# Patient Record
Sex: MALE | Race: BLACK OR AFRICAN AMERICAN | Employment: FULL TIME | URBAN - METROPOLITAN AREA
[De-identification: names, ages, dates, MRNs, and addresses within clinical notes are randomized per-mention and may not be internally consistent; named-entity substitution may affect disease eponyms.]

---

## 2020-10-29 ENCOUNTER — MED REC SCAN ONLY (OUTPATIENT)
Dept: INTERNAL MEDICINE CLINIC | Facility: CLINIC | Age: 60
End: 2020-10-29

## 2020-10-29 ENCOUNTER — LAB ENCOUNTER (OUTPATIENT)
Dept: LAB | Facility: HOSPITAL | Age: 60
End: 2020-10-29
Attending: INTERNAL MEDICINE
Payer: MEDICAID

## 2020-10-29 ENCOUNTER — OFFICE VISIT (OUTPATIENT)
Dept: INTERNAL MEDICINE CLINIC | Facility: CLINIC | Age: 60
End: 2020-10-29
Payer: MEDICAID

## 2020-10-29 VITALS
HEART RATE: 90 BPM | SYSTOLIC BLOOD PRESSURE: 158 MMHG | BODY MASS INDEX: 32.64 KG/M2 | WEIGHT: 241 LBS | HEIGHT: 72 IN | DIASTOLIC BLOOD PRESSURE: 78 MMHG

## 2020-10-29 DIAGNOSIS — Z00.00 ADULT GENERAL MEDICAL EXAM: Primary | ICD-10-CM

## 2020-10-29 DIAGNOSIS — Z12.11 SCREEN FOR COLON CANCER: ICD-10-CM

## 2020-10-29 DIAGNOSIS — Z00.00 ADULT GENERAL MEDICAL EXAM: ICD-10-CM

## 2020-10-29 DIAGNOSIS — Z23 NEED FOR INFLUENZA VACCINATION: ICD-10-CM

## 2020-10-29 PROCEDURE — 93005 ELECTROCARDIOGRAM TRACING: CPT

## 2020-10-29 PROCEDURE — 85027 COMPLETE CBC AUTOMATED: CPT

## 2020-10-29 PROCEDURE — 81001 URINALYSIS AUTO W/SCOPE: CPT

## 2020-10-29 PROCEDURE — 82570 ASSAY OF URINE CREATININE: CPT

## 2020-10-29 PROCEDURE — 36415 COLL VENOUS BLD VENIPUNCTURE: CPT

## 2020-10-29 PROCEDURE — 90686 IIV4 VACC NO PRSV 0.5 ML IM: CPT | Performed by: INTERNAL MEDICINE

## 2020-10-29 PROCEDURE — 80053 COMPREHEN METABOLIC PANEL: CPT

## 2020-10-29 PROCEDURE — 80061 LIPID PANEL: CPT

## 2020-10-29 PROCEDURE — 93010 ELECTROCARDIOGRAM REPORT: CPT | Performed by: INTERNAL MEDICINE

## 2020-10-29 PROCEDURE — 84443 ASSAY THYROID STIM HORMONE: CPT

## 2020-10-29 PROCEDURE — 3078F DIAST BP <80 MM HG: CPT | Performed by: INTERNAL MEDICINE

## 2020-10-29 PROCEDURE — 83036 HEMOGLOBIN GLYCOSYLATED A1C: CPT

## 2020-10-29 PROCEDURE — 3077F SYST BP >= 140 MM HG: CPT | Performed by: INTERNAL MEDICINE

## 2020-10-29 PROCEDURE — 99386 PREV VISIT NEW AGE 40-64: CPT | Performed by: INTERNAL MEDICINE

## 2020-10-29 PROCEDURE — 90471 IMMUNIZATION ADMIN: CPT | Performed by: INTERNAL MEDICINE

## 2020-10-29 PROCEDURE — 82043 UR ALBUMIN QUANTITATIVE: CPT

## 2020-10-29 PROCEDURE — 3008F BODY MASS INDEX DOCD: CPT | Performed by: INTERNAL MEDICINE

## 2020-10-29 NOTE — PROGRESS NOTES
Genevieve Linn is a 64year old male. Patient presents with:  Physical: annual--flu vaccine-est care    HPI:   46-year-old gentleman with past medical history of prediabetes however he is taking Metformin from his previous MD from [place] here to e tightness and wheezing. Cardiovascular: Negative for chest pain, palpitations and leg swelling. Gastrointestinal: Negative for vomiting, abdominal pain, diarrhea, constipation, blood in stool and abdominal distention.    Endocrine: Negative for cold in Referral for screening colonoscopy given. PSA has been ordered. Vaccines: Flu vaccine given  Labs: Ordered  Discussed about seatbelt use, fall prevention, alcohol and driving. Patient denies any abuse or depression.   Patient reports adherence with all t

## 2020-10-30 ENCOUNTER — APPOINTMENT (OUTPATIENT)
Dept: LAB | Facility: HOSPITAL | Age: 60
End: 2020-10-30
Attending: INTERNAL MEDICINE
Payer: MEDICAID

## 2020-10-30 PROCEDURE — 82274 ASSAY TEST FOR BLOOD FECAL: CPT | Performed by: INTERNAL MEDICINE

## 2020-11-05 ENCOUNTER — OFFICE VISIT (OUTPATIENT)
Dept: INTERNAL MEDICINE CLINIC | Facility: CLINIC | Age: 60
End: 2020-11-05
Payer: MEDICAID

## 2020-11-05 VITALS
BODY MASS INDEX: 33 KG/M2 | HEART RATE: 81 BPM | SYSTOLIC BLOOD PRESSURE: 153 MMHG | OXYGEN SATURATION: 97 % | WEIGHT: 241 LBS | DIASTOLIC BLOOD PRESSURE: 74 MMHG

## 2020-11-05 DIAGNOSIS — E78.5 DYSLIPIDEMIA: ICD-10-CM

## 2020-11-05 DIAGNOSIS — N52.9 ERECTILE DYSFUNCTION, UNSPECIFIED ERECTILE DYSFUNCTION TYPE: ICD-10-CM

## 2020-11-05 DIAGNOSIS — E11.9 TYPE 2 DIABETES MELLITUS WITHOUT COMPLICATION, WITHOUT LONG-TERM CURRENT USE OF INSULIN (HCC): Primary | ICD-10-CM

## 2020-11-05 DIAGNOSIS — I10 ESSENTIAL HYPERTENSION: ICD-10-CM

## 2020-11-05 PROCEDURE — 3077F SYST BP >= 140 MM HG: CPT | Performed by: INTERNAL MEDICINE

## 2020-11-05 PROCEDURE — 3078F DIAST BP <80 MM HG: CPT | Performed by: INTERNAL MEDICINE

## 2020-11-05 PROCEDURE — 99214 OFFICE O/P EST MOD 30 MIN: CPT | Performed by: INTERNAL MEDICINE

## 2020-11-05 RX ORDER — LISINOPRIL 20 MG/1
20 TABLET ORAL DAILY
Qty: 90 TABLET | Refills: 1 | Status: SHIPPED | OUTPATIENT
Start: 2020-11-05 | End: 2020-12-22

## 2020-11-05 RX ORDER — ATORVASTATIN CALCIUM 10 MG/1
10 TABLET, FILM COATED ORAL DAILY
Qty: 90 TABLET | Refills: 1 | Status: SHIPPED | OUTPATIENT
Start: 2020-11-05 | End: 2021-03-25

## 2020-11-05 RX ORDER — SILDENAFIL 100 MG/1
100 TABLET, FILM COATED ORAL AS NEEDED
Qty: 24 TABLET | Refills: 1 | Status: SHIPPED | OUTPATIENT
Start: 2020-11-05 | End: 2021-02-03

## 2020-11-05 NOTE — PROGRESS NOTES
Caro Lacy is a 64year old male. Patient presents with: Follow - Up: 1 week    HPI:   53-year-old gentleman here for follow-up. I saw him last week and at that time his blood pressure was high.   He informed me that he was given Metformin, ACE inhibitors dysfunction. Negative for hematuria. Skin: Negative for wound. Psychiatric/Behavioral: Negative for behavioral problems.      Wt Readings from Last 5 Encounters:  11/05/20 : 241 lb (109.3 kg)  10/29/20 : 241 lb (109.3 kg)    Body mass index is 32.69 kg/ 12/17/2020).

## 2020-11-14 ENCOUNTER — TELEPHONE (OUTPATIENT)
Dept: INTERNAL MEDICINE CLINIC | Facility: CLINIC | Age: 60
End: 2020-11-14

## 2020-11-16 ENCOUNTER — TELEPHONE (OUTPATIENT)
Dept: INTERNAL MEDICINE CLINIC | Facility: CLINIC | Age: 60
End: 2020-11-16

## 2020-11-16 RX ORDER — SILDENAFIL 100 MG/1
100 TABLET, FILM COATED ORAL AS NEEDED
Qty: 24 TABLET | Refills: 1 | Status: CANCELLED | OUTPATIENT
Start: 2020-11-16 | End: 2021-02-14

## 2020-12-19 ENCOUNTER — LAB ENCOUNTER (OUTPATIENT)
Dept: LAB | Facility: HOSPITAL | Age: 60
End: 2020-12-19
Attending: INTERNAL MEDICINE
Payer: MEDICAID

## 2020-12-19 DIAGNOSIS — E78.5 DYSLIPIDEMIA: ICD-10-CM

## 2020-12-19 DIAGNOSIS — I10 ESSENTIAL HYPERTENSION: ICD-10-CM

## 2020-12-19 DIAGNOSIS — N52.9 ERECTILE DYSFUNCTION, UNSPECIFIED ERECTILE DYSFUNCTION TYPE: ICD-10-CM

## 2020-12-19 PROCEDURE — 36415 COLL VENOUS BLD VENIPUNCTURE: CPT

## 2020-12-19 PROCEDURE — 83036 HEMOGLOBIN GLYCOSYLATED A1C: CPT

## 2020-12-22 ENCOUNTER — OFFICE VISIT (OUTPATIENT)
Dept: INTERNAL MEDICINE CLINIC | Facility: CLINIC | Age: 60
End: 2020-12-22
Payer: MEDICAID

## 2020-12-22 VITALS
RESPIRATION RATE: 14 BRPM | SYSTOLIC BLOOD PRESSURE: 156 MMHG | WEIGHT: 241 LBS | OXYGEN SATURATION: 98 % | BODY MASS INDEX: 32.64 KG/M2 | HEART RATE: 76 BPM | HEIGHT: 72 IN | DIASTOLIC BLOOD PRESSURE: 82 MMHG

## 2020-12-22 DIAGNOSIS — I10 ESSENTIAL HYPERTENSION: ICD-10-CM

## 2020-12-22 DIAGNOSIS — E11.9 TYPE 2 DIABETES MELLITUS WITHOUT COMPLICATION, WITHOUT LONG-TERM CURRENT USE OF INSULIN (HCC): Primary | ICD-10-CM

## 2020-12-22 DIAGNOSIS — E78.5 DYSLIPIDEMIA: ICD-10-CM

## 2020-12-22 PROCEDURE — 3008F BODY MASS INDEX DOCD: CPT | Performed by: INTERNAL MEDICINE

## 2020-12-22 PROCEDURE — 3079F DIAST BP 80-89 MM HG: CPT | Performed by: INTERNAL MEDICINE

## 2020-12-22 PROCEDURE — 3077F SYST BP >= 140 MM HG: CPT | Performed by: INTERNAL MEDICINE

## 2020-12-22 PROCEDURE — 99214 OFFICE O/P EST MOD 30 MIN: CPT | Performed by: INTERNAL MEDICINE

## 2020-12-22 RX ORDER — OMEGA-3-ACID ETHYL ESTERS 1 G/1
1 CAPSULE, LIQUID FILLED ORAL DAILY
COMMUNITY
End: 2021-03-25

## 2020-12-22 RX ORDER — LANCETS 30 GAUGE
1 EACH MISCELLANEOUS DAILY
COMMUNITY
Start: 2020-11-07

## 2020-12-22 RX ORDER — LISINOPRIL 40 MG/1
40 TABLET ORAL DAILY
Qty: 90 TABLET | Refills: 0 | Status: SHIPPED | OUTPATIENT
Start: 2020-12-22 | End: 2021-03-22

## 2020-12-22 RX ORDER — BLOOD SUGAR DIAGNOSTIC
1 STRIP MISCELLANEOUS DAILY
COMMUNITY
Start: 2020-11-07

## 2020-12-22 RX ORDER — BUPRENORPHINE HCL 8 MG/1
1 TABLET SUBLINGUAL DAILY
COMMUNITY

## 2020-12-22 RX ORDER — BLOOD-GLUCOSE METER
1 EACH MISCELLANEOUS DAILY
COMMUNITY
Start: 2020-11-07 | End: 2021-01-03

## 2020-12-22 NOTE — PROGRESS NOTES
Christina Servin is a 64year old male. Patient presents with: Follow - Up: BP F/u     HPI:   45-year-old gentleman with a past medical history of rectal dysfunction, diabetes, dyslipidemia, hypertension here for follow-up.   He reports that he has been taking hi appetite change and fever. HENT: Negative for congestion, facial swelling and voice change. Eyes: Negative for visual disturbance. Respiratory: Negative for cough, shortness of breath and stridor. Cardiovascular: Negative for chest pain.    Radha Richards Encourage patient to avoid salt. 3. Dyslipidemia  Continue with the statins and omega-3 fatty acid. Encourage patient to avoid fat fried foods and exercise as tolerated.   We will repeat lipid profile in 6 months    Plan: As above hemoglobin A1c ordered

## 2021-01-03 RX ORDER — BLOOD-GLUCOSE METER
EACH MISCELLANEOUS
Qty: 1 KIT | Refills: 0 | Status: SHIPPED | OUTPATIENT
Start: 2021-01-03

## 2021-01-07 ENCOUNTER — OFFICE VISIT (OUTPATIENT)
Dept: OPHTHALMOLOGY | Facility: CLINIC | Age: 61
End: 2021-01-07
Payer: MEDICAID

## 2021-01-07 DIAGNOSIS — H25.13 AGE-RELATED NUCLEAR CATARACT OF BOTH EYES: ICD-10-CM

## 2021-01-07 DIAGNOSIS — H52.203 ASTIGMATISM OF BOTH EYES WITH PRESBYOPIA: ICD-10-CM

## 2021-01-07 DIAGNOSIS — E11.9 TYPE 2 DIABETES MELLITUS WITHOUT RETINOPATHY (HCC): Primary | ICD-10-CM

## 2021-01-07 DIAGNOSIS — H52.4 ASTIGMATISM OF BOTH EYES WITH PRESBYOPIA: ICD-10-CM

## 2021-01-07 PROCEDURE — 99243 OFF/OP CNSLTJ NEW/EST LOW 30: CPT | Performed by: OPHTHALMOLOGY

## 2021-01-07 PROCEDURE — 92015 DETERMINE REFRACTIVE STATE: CPT | Performed by: OPHTHALMOLOGY

## 2021-01-07 NOTE — ASSESSMENT & PLAN NOTE
New glasses for bifocals for best corrected vision at distance and near. Suggest +2.50  over the counter glasses for reading.

## 2021-01-07 NOTE — PATIENT INSTRUCTIONS
Astigmatism of both eyes with presbyopia  New glasses for bifocals for best corrected vision at distance and near. Suggest +2.50  over the counter glasses for reading.     Type 2 diabetes mellitus without retinopathy ([address]  Diabetes type II: no background

## 2021-01-07 NOTE — PROGRESS NOTES
Angela Fabian is a 64year old male.     HPI:     HPI     Consult      Additional comments: Per Dr. Vaibhav Lemon               Diabetic Eye Exam      Additional comments: Pt has been a diabetic for 10 years       Pt's diabetes is currently controlled by pills  Pt MG Oral Tab Take 1 tablet (100 mg total) by mouth as needed for Erectile Dysfunction. 24 tablet 1   • metFORMIN HCl 1000 MG Oral Tab Take 1 tablet (1,000 mg total) by mouth 2 (two) times daily with meals.  180 tablet 1       Allergies:  No Known Allergies [facility] Add Near [address] [facility]    Right -0.25 +0.50 180 20/20 +2.50 20/20    Left [place] +0.50 180 20/20 +2.50 20/20          Final Rx       Sphere Cylinder [place] Dist [facility] Add Near [address] [facility]    Right -0.25 +0.50 180 20/20 +2.50 20/20    Left [place] +0.50 180 20/20 +2.50 20/20    Typ

## 2021-02-01 DIAGNOSIS — Z23 NEED FOR VACCINATION: ICD-10-CM

## 2021-02-03 ENCOUNTER — IMMUNIZATION (OUTPATIENT)
Dept: LAB | Age: 61
End: 2021-02-03
Attending: HOSPITALIST
Payer: MEDICAID

## 2021-02-03 DIAGNOSIS — Z23 NEED FOR VACCINATION: Primary | ICD-10-CM

## 2021-02-03 PROCEDURE — 0001A SARSCOV2 VAC 30MCG/0.3ML IM: CPT

## 2021-02-24 ENCOUNTER — IMMUNIZATION (OUTPATIENT)
Dept: LAB | Age: 61
End: 2021-02-24
Attending: HOSPITALIST
Payer: MEDICAID

## 2021-02-24 DIAGNOSIS — Z23 NEED FOR VACCINATION: Primary | ICD-10-CM

## 2021-02-24 PROCEDURE — 0002A SARSCOV2 VAC 30MCG/0.3ML IM: CPT

## 2021-03-25 ENCOUNTER — OFFICE VISIT (OUTPATIENT)
Dept: INTERNAL MEDICINE CLINIC | Facility: CLINIC | Age: 61
End: 2021-03-25
Payer: MEDICAID

## 2021-03-25 VITALS
HEIGHT: 72 IN | WEIGHT: 236 LBS | BODY MASS INDEX: 31.97 KG/M2 | RESPIRATION RATE: 14 BRPM | HEART RATE: 90 BPM | SYSTOLIC BLOOD PRESSURE: 156 MMHG | DIASTOLIC BLOOD PRESSURE: 86 MMHG | OXYGEN SATURATION: 97 %

## 2021-03-25 DIAGNOSIS — E11.9 TYPE 2 DIABETES MELLITUS WITHOUT RETINOPATHY (HCC): ICD-10-CM

## 2021-03-25 DIAGNOSIS — I10 ESSENTIAL HYPERTENSION: Primary | ICD-10-CM

## 2021-03-25 DIAGNOSIS — N52.9 ERECTILE DYSFUNCTION, UNSPECIFIED ERECTILE DYSFUNCTION TYPE: ICD-10-CM

## 2021-03-25 DIAGNOSIS — E78.5 DYSLIPIDEMIA: ICD-10-CM

## 2021-03-25 LAB
CARTRIDGE LOT#: 775 NUMERIC
HEMOGLOBIN A1C: 7.2 % (ref 4.3–5.6)

## 2021-03-25 PROCEDURE — 3077F SYST BP >= 140 MM HG: CPT | Performed by: INTERNAL MEDICINE

## 2021-03-25 PROCEDURE — 83036 HEMOGLOBIN GLYCOSYLATED A1C: CPT | Performed by: INTERNAL MEDICINE

## 2021-03-25 PROCEDURE — 3079F DIAST BP 80-89 MM HG: CPT | Performed by: INTERNAL MEDICINE

## 2021-03-25 PROCEDURE — 36416 COLLJ CAPILLARY BLOOD SPEC: CPT | Performed by: INTERNAL MEDICINE

## 2021-03-25 PROCEDURE — 99214 OFFICE O/P EST MOD 30 MIN: CPT | Performed by: INTERNAL MEDICINE

## 2021-03-25 PROCEDURE — 3008F BODY MASS INDEX DOCD: CPT | Performed by: INTERNAL MEDICINE

## 2021-03-25 RX ORDER — AMLODIPINE BESYLATE 10 MG/1
10 TABLET ORAL DAILY
Qty: 90 TABLET | Refills: 3 | Status: SHIPPED | OUTPATIENT
Start: 2021-03-25 | End: 2021-05-06

## 2021-03-25 RX ORDER — LISINOPRIL 40 MG/1
40 TABLET ORAL DAILY
Qty: 90 TABLET | Refills: 3 | Status: SHIPPED | OUTPATIENT
Start: 2021-03-25 | End: 2021-05-06

## 2021-03-25 RX ORDER — OMEGA-3-ACID ETHYL ESTERS 1 G/1
1 CAPSULE, LIQUID FILLED ORAL DAILY
Qty: 90 CAPSULE | Refills: 3 | Status: SHIPPED | OUTPATIENT
Start: 2021-03-25

## 2021-03-25 RX ORDER — SILDENAFIL 100 MG/1
100 TABLET, FILM COATED ORAL
Qty: 6 TABLET | Refills: 5 | Status: SHIPPED | OUTPATIENT
Start: 2021-03-25 | End: 2021-03-31

## 2021-03-25 RX ORDER — LISINOPRIL 40 MG/1
40 TABLET ORAL DAILY
COMMUNITY
End: 2021-03-25

## 2021-03-25 RX ORDER — SILDENAFIL 100 MG/1
100 TABLET, FILM COATED ORAL
COMMUNITY
End: 2021-03-25

## 2021-03-25 RX ORDER — ATORVASTATIN CALCIUM 10 MG/1
10 TABLET, FILM COATED ORAL DAILY
Qty: 90 TABLET | Refills: 1 | Status: SHIPPED | OUTPATIENT
Start: 2021-03-25 | End: 2021-05-06

## 2021-03-25 NOTE — PROGRESS NOTES
Benedicto Hampton is a 63year old male. Patient presents with: Follow - Up: 3 mos f/u     HPI:   56-year-old gentleman with a past medical history of hypertension, diabetes, dyslipidemia, erectile dysfunction here for follow-up. Reports that he is doing well. Macular degeneration Neg       No Known Allergies     REVIEW OF SYSTEMS:     Review of Systems   Constitutional: Negative for activity change, appetite change and fever. HENT: Negative for congestion and voice change.     Respiratory: Negative for cough a blood pressure it is running high. I have added amlodipine. Continue the lisinopril as well. Discussed low-salt diet. 2. Type 2 diabetes mellitus without retinopathy ([facility] [address]  She is repeat hemoglobin A1c in the office is 7.2.   We will continue the curr

## 2021-03-26 ENCOUNTER — TELEPHONE (OUTPATIENT)
Dept: INTERNAL MEDICINE CLINIC | Facility: CLINIC | Age: 61
End: 2021-03-26

## 2021-03-26 NOTE — TELEPHONE ENCOUNTER
Prior authorization for Omega-3-acid Ethyl Esters 1GM capsules completed w/ [employer] on cover my meds Key: [id number], turn around time 1-5 days.

## 2021-03-29 ENCOUNTER — TELEPHONE (OUTPATIENT)
Dept: INTERNAL MEDICINE CLINIC | Facility: CLINIC | Age: 61
End: 2021-03-29

## 2021-03-29 RX ORDER — SILDENAFIL 100 MG/1
100 TABLET, FILM COATED ORAL
Qty: 6 TABLET | Refills: 5 | Status: CANCELLED | OUTPATIENT
Start: 2021-03-29 | End: 2021-04-28

## 2021-03-30 NOTE — TELEPHONE ENCOUNTER
Prior authorization for sildenafil has been initiated through [employer] using keycode: [id number] It takes about 1-5 business days for a decision to come back.

## 2021-03-30 NOTE — TELEPHONE ENCOUNTER
He can get omega-3 fatty acid 1 g 2 times a day over-the-counter. Please inform patient.   Thank you

## 2021-03-31 RX ORDER — SILDENAFIL 100 MG/1
TABLET, FILM COATED ORAL
Qty: 24 TABLET | Refills: 0 | Status: SHIPPED | OUTPATIENT
Start: 2021-03-31 | End: 2021-08-26

## 2021-05-06 ENCOUNTER — OFFICE VISIT (OUTPATIENT)
Dept: INTERNAL MEDICINE CLINIC | Facility: CLINIC | Age: 61
End: 2021-05-06
Payer: MEDICAID

## 2021-05-06 VITALS
OXYGEN SATURATION: 98 % | DIASTOLIC BLOOD PRESSURE: 74 MMHG | WEIGHT: 236 LBS | HEIGHT: 72 IN | SYSTOLIC BLOOD PRESSURE: 110 MMHG | BODY MASS INDEX: 31.97 KG/M2 | RESPIRATION RATE: 14 BRPM | HEART RATE: 82 BPM

## 2021-05-06 DIAGNOSIS — Z12.11 SCREEN FOR COLON CANCER: ICD-10-CM

## 2021-05-06 DIAGNOSIS — E66.9 CLASS 1 OBESITY WITHOUT SERIOUS COMORBIDITY WITH BODY MASS INDEX (BMI) OF 30.0 TO 30.9 IN ADULT, UNSPECIFIED OBESITY TYPE: ICD-10-CM

## 2021-05-06 DIAGNOSIS — E11.9 TYPE 2 DIABETES MELLITUS WITHOUT RETINOPATHY (HCC): ICD-10-CM

## 2021-05-06 DIAGNOSIS — I10 ESSENTIAL HYPERTENSION: Primary | ICD-10-CM

## 2021-05-06 DIAGNOSIS — E78.5 DYSLIPIDEMIA: ICD-10-CM

## 2021-05-06 PROCEDURE — 99214 OFFICE O/P EST MOD 30 MIN: CPT | Performed by: INTERNAL MEDICINE

## 2021-05-06 PROCEDURE — 3074F SYST BP LT 130 MM HG: CPT | Performed by: INTERNAL MEDICINE

## 2021-05-06 PROCEDURE — 3008F BODY MASS INDEX DOCD: CPT | Performed by: INTERNAL MEDICINE

## 2021-05-06 PROCEDURE — 3078F DIAST BP <80 MM HG: CPT | Performed by: INTERNAL MEDICINE

## 2021-05-06 RX ORDER — ATORVASTATIN CALCIUM 10 MG/1
10 TABLET, FILM COATED ORAL DAILY
Qty: 90 TABLET | Refills: 1 | Status: SHIPPED | OUTPATIENT
Start: 2021-05-06 | End: 2022-05-01

## 2021-05-06 RX ORDER — AMLODIPINE BESYLATE 10 MG/1
10 TABLET ORAL DAILY
Qty: 90 TABLET | Refills: 1 | Status: SHIPPED | OUTPATIENT
Start: 2021-05-06

## 2021-05-06 RX ORDER — LISINOPRIL 40 MG/1
40 TABLET ORAL DAILY
Qty: 90 TABLET | Refills: 1 | Status: SHIPPED | OUTPATIENT
Start: 2021-05-06 | End: 2021-08-04

## 2021-05-06 NOTE — PROGRESS NOTES
Andrew Atlamirano is a 63year old male. Patient presents with: Follow - Up: BP F/u   Hypertension  Follow-up of diabetes, hypertension and dyslipidemia.   HPI:   65-year-old gentleman with a past medical history of diabetes, hypertension, dyslipidemia, erectile Problem Relation Age of Onset   • Diabetes Neg    • Glaucoma Neg    • Macular degeneration Neg       No Known Allergies     REVIEW OF SYSTEMS:     Review of Systems   Constitutional: Negative for activity change, appetite change and fever.    HENT: Gabriele Lynn Behavior: Behavior normal.            ASSESSMENT AND PLAN:   1. Essential hypertension  Well-controlled. Medication refilled. Labs reviewed. Discussed low-salt diet. He will increase activity during summertime.     2. Type 2 diabetes mellitus without re

## 2021-06-23 ENCOUNTER — OFFICE VISIT (OUTPATIENT)
Dept: INTERNAL MEDICINE CLINIC | Facility: CLINIC | Age: 61
End: 2021-06-23
Payer: MEDICAID

## 2021-06-23 ENCOUNTER — LAB ENCOUNTER (OUTPATIENT)
Dept: LAB | Facility: HOSPITAL | Age: 61
End: 2021-06-23
Attending: INTERNAL MEDICINE
Payer: MEDICAID

## 2021-06-23 VITALS
HEART RATE: 86 BPM | HEIGHT: 72 IN | BODY MASS INDEX: 31.02 KG/M2 | WEIGHT: 229 LBS | SYSTOLIC BLOOD PRESSURE: 120 MMHG | OXYGEN SATURATION: 98 % | DIASTOLIC BLOOD PRESSURE: 74 MMHG | RESPIRATION RATE: 14 BRPM

## 2021-06-23 DIAGNOSIS — Z00.00 ADULT GENERAL MEDICAL EXAM: ICD-10-CM

## 2021-06-23 DIAGNOSIS — Z00.00 ADULT GENERAL MEDICAL EXAM: Primary | ICD-10-CM

## 2021-06-23 PROCEDURE — 81003 URINALYSIS AUTO W/O SCOPE: CPT

## 2021-06-23 PROCEDURE — 86580 TB INTRADERMAL TEST: CPT | Performed by: INTERNAL MEDICINE

## 2021-06-23 PROCEDURE — 99396 PREV VISIT EST AGE 40-64: CPT | Performed by: INTERNAL MEDICINE

## 2021-06-23 PROCEDURE — 3074F SYST BP LT 130 MM HG: CPT | Performed by: INTERNAL MEDICINE

## 2021-06-23 PROCEDURE — 80307 DRUG TEST PRSMV CHEM ANLYZR: CPT

## 2021-06-23 PROCEDURE — 3078F DIAST BP <80 MM HG: CPT | Performed by: INTERNAL MEDICINE

## 2021-06-23 PROCEDURE — 3008F BODY MASS INDEX DOCD: CPT | Performed by: INTERNAL MEDICINE

## 2021-06-23 NOTE — PROGRESS NOTES
Danilo Stout is a 63year old male. Patient presents with:  Employment Physical: Form fill out     HPI:   53-year-old gentleman with a past medical history of hypertension, diabetes, dyslipidemia here for preemployment physical.  He has no complaints.   He h Past Medical History:   Diagnosis Date   • Pre-diabetes       Past Surgical History:   Procedure Laterality Date   • APPENDECTOMY     • US BIOPSY RENAL RIGHT (CPT=76942/45589)        Social History:  Social History    Tobacco Use      Smoking status: Nev Normocephalic. Right Ear: Tympanic membrane normal.      Left Ear: Tympanic membrane normal.      Nose: Nose normal.      Mouth/Throat:      Mouth: Mucous membranes are moist.      Pharynx: Oropharynx is clear.    Eyes:      General: No scleral icterus have reviewed his preemployment requirement. he needs tuberculin skin test.  He needs urine pH and drug screen. I have ordered for that. - DRUG SCREEN 7 W/CONFIRMATION, URINE; Future  - PH, URINE;  Future    Plan: If his drug screen is okay and tubercu

## 2021-06-25 ENCOUNTER — NURSE ONLY (OUTPATIENT)
Dept: INTERNAL MEDICINE CLINIC | Facility: CLINIC | Age: 61
End: 2021-06-25
Payer: MEDICAID

## 2021-06-25 DIAGNOSIS — Z71.1 PERSON WITH FEARED COMPLAINT IN WHOM NO DIAGNOSIS WAS MADE: Primary | ICD-10-CM

## 2021-08-26 RX ORDER — SILDENAFIL 100 MG/1
TABLET, FILM COATED ORAL
Qty: 24 TABLET | Refills: 0 | Status: SHIPPED | OUTPATIENT
Start: 2021-08-26

## 2021-11-30 ENCOUNTER — IMMUNIZATION (OUTPATIENT)
Dept: LAB | Facility: HOSPITAL | Age: 61
End: 2021-11-30
Attending: EMERGENCY MEDICINE
Payer: COMMERCIAL

## 2021-11-30 DIAGNOSIS — Z23 NEED FOR VACCINATION: Primary | ICD-10-CM

## 2021-11-30 PROCEDURE — 0004A SARSCOV2 VAC 30MCG/0.3ML IM: CPT

## 2022-02-10 RX ORDER — SILDENAFIL 100 MG/1
100 TABLET, FILM COATED ORAL AS NEEDED
Qty: 24 TABLET | Refills: 0 | Status: SHIPPED | OUTPATIENT
Start: 2022-02-10

## 2022-02-10 NOTE — TELEPHONE ENCOUNTER
Please review; protocol failed. Or has no protocol    Requested Prescriptions   Pending Prescriptions Disp Refills    Sildenafil Citrate 100 MG Oral Tab 24 tablet 0     Sig: Take 1 tablet (100 mg total) by mouth as needed for Erectile Dysfunction.         Genitourinary Medications Passed - 2/9/2022  8:48 PM        Passed - Patient does not have pulmonary hypertension on problem list        Passed - Appointment in the past 12 or next 3 months               Recent Outpatient Visits              7 months ago Person with feared complaint in whom no diagnosis was made    [facility], [address], [facility]    Nurse Only    7 months ago Adult general medical exam    [facility], [address], Elida Mckay MD    Office Visit    9 months ago Essential hypertension    [facility], [address], Elida Mckay MD    Office Visit    10 months ago Essential hypertension    [facility], [address], Elida Mckay MD    Office Visit    1 year ago Type 2 diabetes mellitus without retinopathy [facility] Shyann Kirby MD    Office Visit

## 2022-03-16 ENCOUNTER — TELEPHONE (OUTPATIENT)
Dept: INTERNAL MEDICINE CLINIC | Facility: CLINIC | Age: 62
End: 2022-03-16

## 2022-03-16 DIAGNOSIS — Z12.11 COLON CANCER SCREENING: Primary | ICD-10-CM

## 2022-04-21 ENCOUNTER — LAB ENCOUNTER (OUTPATIENT)
Dept: LAB | Facility: HOSPITAL | Age: 62
End: 2022-04-21
Attending: INTERNAL MEDICINE
Payer: COMMERCIAL

## 2022-04-21 ENCOUNTER — HOSPITAL ENCOUNTER (OUTPATIENT)
Dept: GENERAL RADIOLOGY | Facility: HOSPITAL | Age: 62
Discharge: HOME | End: 2022-04-21
Attending: INTERNAL MEDICINE
Payer: COMMERCIAL

## 2022-04-21 ENCOUNTER — OFFICE VISIT (OUTPATIENT)
Dept: INTERNAL MEDICINE CLINIC | Facility: CLINIC | Age: 62
End: 2022-04-21
Payer: COMMERCIAL

## 2022-04-21 VITALS
WEIGHT: 233 LBS | HEART RATE: 78 BPM | HEIGHT: 72 IN | SYSTOLIC BLOOD PRESSURE: 130 MMHG | RESPIRATION RATE: 14 BRPM | OXYGEN SATURATION: 98 % | BODY MASS INDEX: 31.56 KG/M2 | DIASTOLIC BLOOD PRESSURE: 84 MMHG

## 2022-04-21 DIAGNOSIS — Z00.00 ADULT GENERAL MEDICAL EXAM: ICD-10-CM

## 2022-04-21 DIAGNOSIS — E11.9 TYPE 2 DIABETES MELLITUS WITHOUT RETINOPATHY (HCC): Primary | ICD-10-CM

## 2022-04-21 DIAGNOSIS — Z12.11 SCREEN FOR COLON CANCER: ICD-10-CM

## 2022-04-21 DIAGNOSIS — R07.89 ATYPICAL CHEST PAIN: ICD-10-CM

## 2022-04-21 DIAGNOSIS — Z00.00 ROUTINE GENERAL MEDICAL EXAMINATION AT A HEALTH CARE FACILITY: Primary | ICD-10-CM

## 2022-04-21 LAB
ALBUMIN SERPL-MCNC: 4.4 G/DL (ref 3.4–5)
ALBUMIN/GLOB SERPL: 1.3 {RATIO} (ref 1–2)
ALP LIVER SERPL-CCNC: 81 U/L
ALT SERPL-CCNC: 27 U/L
ANION GAP SERPL CALC-SCNC: 8 MMOL/L (ref 0–18)
AST SERPL-CCNC: 18 U/L (ref 15–37)
BILIRUB SERPL-MCNC: 0.3 MG/DL (ref 0.1–2)
BUN BLD-MCNC: 15 MG/DL (ref 7–18)
BUN/CREAT SERPL: 16 (ref 10–20)
CALCIUM BLD-MCNC: 9.4 MG/DL (ref 8.5–10.1)
CHLORIDE SERPL-SCNC: 103 MMOL/L (ref 98–112)
CHOLEST SERPL-MCNC: 180 MG/DL (ref ?–200)
CO2 SERPL-SCNC: 26 MMOL/L (ref 21–32)
COMPLEXED PSA SERPL-MCNC: 3.76 NG/ML (ref ?–4)
CREAT BLD-MCNC: 0.94 MG/DL
CREAT UR-SCNC: 131 MG/DL
DEPRECATED RDW RBC AUTO: 40.4 FL (ref 35.1–46.3)
ERYTHROCYTE [DISTWIDTH] IN BLOOD BY AUTOMATED COUNT: 12.1 % (ref 11–15)
EST. AVERAGE GLUCOSE BLD GHB EST-MCNC: 235 MG/DL (ref 68–126)
FASTING PATIENT LIPID ANSWER: YES
FASTING STATUS PATIENT QL REPORTED: YES
GLOBULIN PLAS-MCNC: 3.4 G/DL (ref 2.8–4.4)
GLUCOSE BLD-MCNC: 162 MG/DL (ref 70–99)
HBA1C MFR BLD: 9.8 % (ref ?–5.7)
HCT VFR BLD AUTO: 42.9 %
HDLC SERPL-MCNC: 44 MG/DL (ref 40–59)
HGB BLD-MCNC: 14 G/DL
LDLC SERPL CALC-MCNC: 110 MG/DL (ref ?–100)
MCH RBC QN AUTO: 29.7 PG (ref 26–34)
MCHC RBC AUTO-ENTMCNC: 32.6 G/DL (ref 31–37)
MCV RBC AUTO: 90.9 FL
MICROALBUMIN UR-MCNC: 64.8 MG/DL
MICROALBUMIN/CREAT 24H UR-RTO: 494.7 UG/MG (ref ?–30)
NONHDLC SERPL-MCNC: 136 MG/DL (ref ?–130)
OSMOLALITY SERPL CALC.SUM OF ELEC: 288 MOSM/KG (ref 275–295)
PLATELET # BLD AUTO: 255 10(3)UL (ref 150–450)
POTASSIUM SERPL-SCNC: 4 MMOL/L (ref 3.5–5.1)
PROT SERPL-MCNC: 7.8 G/DL (ref 6.4–8.2)
RBC # BLD AUTO: 4.72 X10(6)UL
SODIUM SERPL-SCNC: 137 MMOL/L (ref 136–145)
TRIGL SERPL-MCNC: 149 MG/DL (ref 30–149)
TSI SER-ACNC: 1.31 MIU/ML (ref 0.36–3.74)
VLDLC SERPL CALC-MCNC: 25 MG/DL (ref 0–30)
WBC # BLD AUTO: 7.5 X10(3) UL (ref 4–11)

## 2022-04-21 PROCEDURE — 71046 X-RAY EXAM CHEST 2 VIEWS: CPT | Performed by: INTERNAL MEDICINE

## 2022-04-21 PROCEDURE — 80053 COMPREHEN METABOLIC PANEL: CPT | Performed by: INTERNAL MEDICINE

## 2022-04-21 PROCEDURE — 84443 ASSAY THYROID STIM HORMONE: CPT | Performed by: INTERNAL MEDICINE

## 2022-04-21 PROCEDURE — 99214 OFFICE O/P EST MOD 30 MIN: CPT | Performed by: INTERNAL MEDICINE

## 2022-04-21 PROCEDURE — 85027 COMPLETE CBC AUTOMATED: CPT | Performed by: INTERNAL MEDICINE

## 2022-04-21 PROCEDURE — 3008F BODY MASS INDEX DOCD: CPT | Performed by: INTERNAL MEDICINE

## 2022-04-21 PROCEDURE — 36415 COLL VENOUS BLD VENIPUNCTURE: CPT | Performed by: INTERNAL MEDICINE

## 2022-04-21 PROCEDURE — 82570 ASSAY OF URINE CREATININE: CPT | Performed by: INTERNAL MEDICINE

## 2022-04-21 PROCEDURE — 3079F DIAST BP 80-89 MM HG: CPT | Performed by: INTERNAL MEDICINE

## 2022-04-21 PROCEDURE — 83036 HEMOGLOBIN GLYCOSYLATED A1C: CPT | Performed by: INTERNAL MEDICINE

## 2022-04-21 PROCEDURE — 82043 UR ALBUMIN QUANTITATIVE: CPT | Performed by: INTERNAL MEDICINE

## 2022-04-21 PROCEDURE — 80061 LIPID PANEL: CPT

## 2022-04-21 PROCEDURE — 3075F SYST BP GE 130 - 139MM HG: CPT | Performed by: INTERNAL MEDICINE

## 2022-04-21 RX ORDER — ATORVASTATIN CALCIUM 10 MG/1
10 TABLET, FILM COATED ORAL DAILY
Qty: 90 TABLET | Refills: 1 | Status: SHIPPED | OUTPATIENT
Start: 2022-04-21 | End: 2023-04-16

## 2022-04-21 RX ORDER — AMLODIPINE BESYLATE 10 MG/1
10 TABLET ORAL DAILY
Qty: 90 TABLET | Refills: 1 | Status: SHIPPED | OUTPATIENT
Start: 2022-04-21

## 2022-04-22 RX ORDER — LISINOPRIL 5 MG/1
5 TABLET ORAL DAILY
Qty: 90 TABLET | Refills: 1 | Status: SHIPPED | OUTPATIENT
Start: 2022-04-22 | End: 2022-07-21

## 2022-04-27 ENCOUNTER — LAB ENCOUNTER (OUTPATIENT)
Dept: LAB | Facility: HOSPITAL | Age: 62
End: 2022-04-27
Attending: INTERNAL MEDICINE
Payer: COMMERCIAL

## 2022-04-27 DIAGNOSIS — R07.89 ATYPICAL CHEST PAIN: ICD-10-CM

## 2022-04-28 LAB — SARS-COV-2 RNA RESP QL NAA+PROBE: NOT DETECTED

## 2022-06-15 RX ORDER — SILDENAFIL 100 MG/1
100 TABLET, FILM COATED ORAL AS NEEDED
Qty: 24 TABLET | Refills: 0 | Status: SHIPPED | OUTPATIENT
Start: 2022-06-15

## 2022-10-05 DIAGNOSIS — Z00.00 ADULT GENERAL MEDICAL EXAM: ICD-10-CM

## 2022-10-06 ENCOUNTER — TELEPHONE (OUTPATIENT)
Dept: INTERNAL MEDICINE CLINIC | Facility: CLINIC | Age: 62
End: 2022-10-06

## 2022-10-06 RX ORDER — SILDENAFIL 100 MG/1
100 TABLET, FILM COATED ORAL AS NEEDED
Qty: 24 TABLET | Refills: 0 | Status: SHIPPED | OUTPATIENT
Start: 2022-10-06

## 2022-10-06 RX ORDER — AMLODIPINE BESYLATE 10 MG/1
10 TABLET ORAL DAILY
Qty: 90 TABLET | Refills: 1 | Status: SHIPPED | OUTPATIENT
Start: 2022-10-06

## 2022-10-06 RX ORDER — ATORVASTATIN CALCIUM 10 MG/1
10 TABLET, FILM COATED ORAL DAILY
Qty: 90 TABLET | Refills: 1 | Status: SHIPPED | OUTPATIENT
Start: 2022-10-06 | End: 2023-10-01

## 2022-10-06 RX ORDER — SILDENAFIL 100 MG/1
100 TABLET, FILM COATED ORAL AS NEEDED
Qty: 24 TABLET | Refills: 0 | OUTPATIENT
Start: 2022-10-06

## 2022-10-06 NOTE — TELEPHONE ENCOUNTER
Protocol failed or has No Protocol, please review  Requested Prescriptions   Pending Prescriptions Disp Refills    metFORMIN HCl 1000 MG Oral Tab 180 tablet 1     Sig: Take 1 tablet (1,000 mg total) by mouth 2 (two) times daily with meals. Diabetes Medication Protocol Failed - 10/5/2022  4:57 PM        Failed - Last A1C < 7.5 and within past 6 months     Lab Results   Component Value Date    A1C 9.8 (H) 04/21/2022               Passed - In person appointment or virtual visit in the past 6 mos or appointment in next 3 mos       Recent Outpatient Visits              5 months ago Type 2 diabetes mellitus without retinopathy [facility])    [facility], [address], [place]    Office Visit    1 year ago Person with feared complaint in whom no diagnosis was made    [facility], [address], [place]    Nurse Only    1 year ago Adult general medical exam    [facility], [address], [place]    Office Visit    1 year ago Essential hypertension    [facility], [address], [place]    Office Visit    1 year ago Essential hypertension    [facility], [address], [place]    Office Visit     Future Appointments         Provider Department Appt Notes    In 1 week [facility] or GFRAA > 50     GFR Evaluation  GFRAA: 101 , resulted on 4/21/2022            Passed - GFR in the past 12 months          Signed Prescriptions Disp Refills    atorvastatin (LIPITOR) 10 MG Oral Tab 90 tablet 1     Sig: Take 1 tablet (10 mg total) by mouth daily.         Cholesterol Medication Protocol Passed - 10/5/2022  4:57 PM        Passed - ALT in past 12 months        Passed - LDL in past 12 months        Passed - Last ALT < 80       Lab Results   Component Value Date    ALT 27 04/21/2022             Passed - Last LDL < 130     Lab Results   Component Value Date     (H) 04/21/2022               Passed - In person appointment or virtual visit in the past 12 mos or appointment in next 3 mos       Recent Outpatient Visits              5 months ago Type 2 diabetes mellitus without retinopathy [facility])    [address], [address], [place]    Office Visit    1 year ago Person with feared complaint in whom no diagnosis was made    [address] [place], [address], [address]    Nurse Only    1 year ago Adult general medical exam    [address] [place], [address], [place]    Office Visit    1 year ago Essential hypertension    [address] [place], [address], [place]    Office Visit    1 year ago Essential hypertension    [address] [place], [address], [place]    Office Visit     Future Appointments         Provider Department Appt Notes    In 1 week [address] [place] [address]                   amLODIPine 10 MG Oral Tab 90 tablet 1     Sig: Take 1 tablet (10 mg total) by mouth daily.         Hypertensive Medications Protocol Passed - 10/5/2022  4:57 PM        Passed - In person appointment in the past 12 or next 3 months       Recent Outpatient Visits              5 months ago Type 2 diabetes mellitus without retinopathy [facility])    [address], [address], [place]    Office Visit    1 year ago Person with feared complaint in whom no diagnosis was made    [address] [place], [address], [address]    Nurse Only    1 year ago Adult general medical exam    [address] [place], [address], [place]    Office Visit    1 year ago Essential hypertension    [address] [place], [address], [place]    Office Visit    1 year ago Essential hypertension    [facility], [place]    Office Visit     Future Appointments Provider Department Appt Notes    In 1 week [facility]                Passed - Last BP reading less than 140/90     BP Readings from Last 1 Encounters:  04/21/22 : 130/84                Passed - CMP or BMP in past 6 months     Recent Results (from the past 4392 hour(s))   COMP METABOLIC PANEL (14)    Collection Time: 04/21/22  3:25 PM   Result Value Ref Range    Glucose 162 (H) 70 - 99 mg/dL    Sodium 137 136 - 145 mmol/L    Potassium 4.0 3.5 - 5.1 mmol/L    Chloride 103 98 - 112 mmol/L    CO2 26.0 21.0 - 32.0 mmol/L    Anion Gap 8 0 - 18 mmol/L    BUN 15 7 - 18 mg/dL    Creatinine 0.94 0.70 - 1.30 mg/dL    BUN/CREA Ratio 16.0 10.0 - 20.0    Calcium, Total 9.4 8.5 - 10.1 mg/dL    Calculated Osmolality 288 275 - 295 mOsm/kg    GFR, Non- 88 >=60    GFR, -American 101 >=60    ALT 27 16 - 61 U/L    AST 18 15 - 37 U/L    Alkaline Phosphatase 81 45 - 117 U/L    Bilirubin, Total 0.3 0.1 - 2.0 mg/dL    Total Protein 7.8 6.4 - 8.2 g/dL    Albumin 4.4 3.4 - 5.0 g/dL    Globulin  3.4 2.8 - 4.4 g/dL    A/G Ratio 1.3 1.0 - 2.0    Patient Fasting for CMP? Yes      *Note: Due to a large number of results and/or encounters for the requested time period, some results have not been displayed. A complete set of results can be found in Results Review.                  Passed - In person appointment or virtual visit in the past 6 months       Recent Outpatient Visits              5 months ago Type 2 diabetes mellitus without retinopathy [facility])    [place] [facility], [address], [place]    Office Visit    1 year ago Person with feared complaint in whom no diagnosis was made    [place] [facility], [facility], [address], [place]    Nurse Only    1 year ago Adult general medical exam    Marta Valdes MD    Office Visit    1 year ago Essential hypertension    [address] [facility] Doretha Rodriguez MD    Office Visit    1 year ago Essential hypertension    Danae Urrutia MD    Office Visit     Future Appointments         Provider Department Appt Notes    In 1 week Artem Pitts                Passed - [facility] or GFRAA > 50     GFR Evaluation  GFRAA: 101 , resulted on 4/21/2022               Sildenafil Citrate 100 MG Oral Tab 24 tablet 0     Sig: Take 1 tablet (100 mg total) by mouth as needed for Erectile Dysfunction.         Genitourinary Medications Passed - 10/5/2022  4:57 PM        Passed - Patient does not have pulmonary hypertension on problem list        Passed - In person appointment or virtual visit in the past 12 mos or appointment in next 3 mos       Recent Outpatient Visits              5 months ago Type 2 diabetes mellitus without retinopathy [facility])    [address], Danae Garza MD    Office Visit    1 year ago Person with feared complaint in whom no diagnosis was made    [address], [place]    Nurse Only    1 year ago Adult general medical exam    [address] Mildred Champion, MD    Office Visit    1 year ago Essential hypertension    [address] Mildred Champion, MD    Office Visit    1 year ago Essential hypertension    [address] Mildred Champion, MD    Office Visit     Future Appointments         Provider Department Appt Notes    In 1 week [address] [facility] [address]                    Future Appointments         Provider Department Appt Notes    In 1 week Artem Pitts           Recent Outpatient Visits              5 months ago Type 2 diabetes mellitus without retinopathy [facility])    [address], [address], [place]    Office Visit    1 year ago Person with feared complaint in whom no diagnosis was made    [address], [address], [place]    Nurse Only    1 year ago Adult general medical exam    [address], [address], [place]    Office Visit    1 year ago Essential hypertension    [address], [address], [place]    Office Visit    1 year ago Essential hypertension    [place] Matt MD    Office Visit

## 2022-10-06 NOTE — TELEPHONE ENCOUNTER
Refill passed per [address] protocol. Requested Prescriptions   Pending Prescriptions Disp Refills    metFORMIN HCl 1000 MG Oral Tab 180 tablet 1     Sig: Take 1 tablet (1,000 mg total) by mouth 2 (two) times daily with meals. Diabetes Medication Protocol Failed - 10/5/2022  4:57 PM        Failed - Last A1C < 7.5 and within past 6 months     Lab Results   Component Value Date    A1C 9.8 (H) 04/21/2022               Passed - In person appointment or virtual visit in the past 6 mos or appointment in next 3 mos       Recent Outpatient Visits              5 months ago Type 2 diabetes mellitus without retinopathy [facility])    [address], [address], [place]    Office Visit    1 year ago Person with feared complaint in whom no diagnosis was made    [address], [address], [facility]    Nurse Only    1 year ago Adult general medical exam    [address], [address], [place]    Office Visit    1 year ago Essential hypertension    [address], [address], [place]    Office Visit    1 year ago Essential hypertension    [address], [address], [place]    Office Visit     Future Appointments         Provider Department Appt Notes    In 1 week 742 [facility] or GFRAA > 50     GFR Evaluation  GFRAA: 101 , resulted on 4/21/2022            Passed - GFR in the past 12 months           atorvastatin (LIPITOR) 10 MG Oral Tab 90 tablet 1     Sig: Take 1 tablet (10 mg total) by mouth daily.         Cholesterol Medication Protocol Passed - 10/5/2022  4:57 PM        Passed - ALT in past 12 months        Passed - LDL in past 12 months        Passed - Last ALT < 80       Lab Results   Component Value Date    ALT 27 04/21/2022             Passed - Last LDL < 130     Lab Results   Component Value Date     (H) 04/21/2022               Passed - In person appointment or virtual visit in the past 12 mos or appointment in next 3 mos       Recent Outpatient Visits              5 months ago Type 2 diabetes mellitus without retinopathy [facility])    Swathi Esteves, [address], [place]    Office Visit    1 year ago Person with feared complaint in whom no diagnosis was made    Swathi Esteves, [address], [place]    Nurse Only    1 year ago Adult general medical exam    Swathi Esteves, [address], [place]    Office Visit    1 year ago Essential hypertension    Swathi Esteves, [address], [place]    Office Visit    1 year ago Essential hypertension    Swathi Esteves, [address], [place]    Office Visit     Future Appointments         Provider Department Appt Notes    In 1 week [address] [place] [address]                   amLODIPine 10 MG Oral Tab 90 tablet 1     Sig: Take 1 tablet (10 mg total) by mouth daily.         Hypertensive Medications Protocol Passed - 10/5/2022  4:57 PM        Passed - In person appointment in the past 12 or next 3 months       Recent Outpatient Visits              5 months ago Type 2 diabetes mellitus without retinopathy [facility])    Swathi Esteves, [address], [place]    Office Visit    1 year ago Person with feared complaint in whom no diagnosis was made    Swathi Estevse, [address], [place]    Nurse Only    1 year ago Adult general medical exam    Swathi Esteves, [address], [place]    Office Visit    1 year ago Essential hypertension    Swathi Esteves, [address]y MD    Office Visit    1 year ago Essential hypertension    Swathi Esteves, [address]y MD    Office Visit     Future Appointments         Provider Department Appt Notes    In 1 week 137 [facility]                Passed - Last BP reading less than 140/90     BP Readings from Last 1 Encounters:  04/21/22 : 130/84                Passed - CMP or BMP in past 6 months     Recent Results (from the past 4392 hour(s))   COMP METABOLIC PANEL (14)    Collection Time: 04/21/22  3:25 PM   Result Value Ref Range    Glucose 162 (H) 70 - 99 mg/dL    Sodium 137 136 - 145 mmol/L    Potassium 4.0 3.5 - 5.1 mmol/L    Chloride 103 98 - 112 mmol/L    CO2 26.0 21.0 - 32.0 mmol/L    Anion Gap 8 0 - 18 mmol/L    BUN 15 7 - 18 mg/dL    Creatinine 0.94 0.70 - 1.30 mg/dL    BUN/CREA Ratio 16.0 10.0 - 20.0    Calcium, Total 9.4 8.5 - 10.1 mg/dL    Calculated Osmolality 288 275 - 295 mOsm/kg    GFR, Non- 88 >=60    GFR, -American 101 >=60    ALT 27 16 - 61 U/L    AST 18 15 - 37 U/L    Alkaline Phosphatase 81 45 - 117 U/L    Bilirubin, Total 0.3 0.1 - 2.0 mg/dL    Total Protein 7.8 6.4 - 8.2 g/dL    Albumin 4.4 3.4 - 5.0 g/dL    Globulin  3.4 2.8 - 4.4 g/dL    A/G Ratio 1.3 1.0 - 2.0    Patient Fasting for CMP? Yes      *Note: Due to a large number of results and/or encounters for the requested time period, some results have not been displayed. A complete set of results can be found in Results Review.                  Passed - In person appointment or virtual visit in the past 6 months       Recent Outpatient Visits              5 months ago Type 2 diabetes mellitus without retinopathy [facility])    [place] [facility], [address], [place]    Office Visit    1 year ago Person with feared complaint in whom no diagnosis was made    [facility], [address], [employer]    Nurse Only    1 year ago Adult general medical exam    [facility], [address], [place] Sus[place]    Office Visit    1 year ago Essential hypertension    Susy Farooq MD Office Visit    1 year ago Essential hypertension    [facility], [address], Fadia Gonzalez MD    Office Visit     Future Appointments         Provider Department Appt Notes    In 1 week [address] [place] [address]                Passed - EGFRCR or GFRAA > 50     GFR Evaluation  GFRAA: 101 , resulted on 4/21/2022               Sildenafil Citrate 100 MG Oral Tab 24 tablet 0     Sig: Take 1 tablet (100 mg total) by mouth as needed for Erectile Dysfunction.         Genitourinary Medications Passed - 10/5/2022  4:57 PM        Passed - Patient does not have pulmonary hypertension on problem list        Passed - In person appointment or virtual visit in the past 12 mos or appointment in next 3 mos       Recent Outpatient Visits              5 months ago Type 2 diabetes mellitus without retinopathy [facility])    [facility], [address], Fadia Gonzalez MD    Office Visit    1 year ago Person with feared complaint in whom no diagnosis was made    [facility], [address], [employer]    Nurse Only    1 year ago Adult general medical exam    [facility], [address], Fadia Gonzalez MD    Office Visit    1 year ago Essential hypertension    [facility], [address], Fadia Gonzalez MD    Office Visit    1 year ago Essential hypertension    [facility], [address]Fadia MD    Office Visit     Future Appointments         Provider Department Appt Notes    In 1 week Artem Pitts                    Recent Outpatient Visits              5 months ago Type 2 diabetes mellitus without retinopathy [facility])    Rico Calixto, Fadia Gonzalez MD    Office Visit    1 year ago Person with feared complaint in whom no diagnosis was made    [employer], [address], [employer]    Nurse Only    1 year ago Adult general medical exam    Jonathan Cruz MD    Office Visit    1 year ago Essential hypertension    Susy Sandoval MD    Office Visit    1 year ago Essential hypertension    [address], [place], MD Susy    Office Visit          Future Appointments         Provider Department Appt Notes    In 1 week [address] [facility] [address] [facility] [address]

## 2022-10-10 NOTE — TELEPHONE ENCOUNTER
PA -  [address] [facility] [id number]  [place]  ID [id number]  [facility] [id number]    #[phone]        Medical records [phone] OV   Sent records from 2021, last OV with dx code of ED. Await form via fax.

## 2022-10-11 ENCOUNTER — MED REC SCAN ONLY (OUTPATIENT)
Dept: INTERNAL MEDICINE CLINIC | Facility: CLINIC | Age: 62
End: 2022-10-11

## 2022-10-15 ENCOUNTER — IMMUNIZATION (OUTPATIENT)
Dept: LAB | Age: 62
End: 2022-10-15
Attending: EMERGENCY MEDICINE
Payer: COMMERCIAL

## 2022-10-15 DIAGNOSIS — Z23 NEED FOR VACCINATION: Primary | ICD-10-CM

## 2022-10-15 PROCEDURE — 90471 IMMUNIZATION ADMIN: CPT

## 2022-10-15 PROCEDURE — 0124A SARSCOV2 VAC BVL 30MCG/0.3ML: CPT

## 2022-10-15 PROCEDURE — 90686 IIV4 VACC NO PRSV 0.5 ML IM: CPT

## 2023-01-12 ENCOUNTER — TELEPHONE (OUTPATIENT)
Dept: INTERNAL MEDICINE CLINIC | Facility: CLINIC | Age: 63
End: 2023-01-12

## 2023-01-12 DIAGNOSIS — E11.9 TYPE 2 DIABETES MELLITUS WITHOUT RETINOPATHY (HCC): Primary | ICD-10-CM

## 2023-01-12 NOTE — TELEPHONE ENCOUNTER
Refill passed per [facility] protocol. Tadalafil 2.5 mg or 5mg preferred - please review. Thank you. [employer] Requested Prescriptions   Pending Prescriptions Disp Refills    Sildenafil Citrate 100 MG Oral Tab 24 tablet 0     Sig: Take 1 tablet (100 mg total) by mouth as needed for Erectile Dysfunction.        Genitourinary Medications Passed - 1/10/2023  2:14 PM        Passed - Patient does not have pulmonary hypertension on problem list        Passed - In person appointment or virtual visit in the past 12 mos or appointment in next 3 mos     Recent Outpatient Visits              8 months ago Type 2 diabetes mellitus without retinopathy [facility])    [facility], [address]y MD    Office Visit    1 year ago Person with feared complaint in whom no diagnosis was made    [facility], [address], [place]    Nurse Only    1 year ago Adult general medical exam    [facility], [address]y MD    Office Visit    1 year ago Essential hypertension    [facility], [address]Susy MD    Office Visit    1 year ago Essential hypertension    [facility], [address]Susy MD    Office Visit                           Recent Outpatient Visits              8 months ago Type 2 diabetes mellitus without retinopathy [facility])    [facility], [address]Susy MD    Office Visit    1 year ago Person with feared complaint in whom no diagnosis was made    [facility], [address], [place]    Nurse Only    1 year ago Adult general medical exam    [facility], [address]Susy MD    Office Visit    1 year ago Essential hypertension    [facility], [address]Susy MD    Office Visit    1 year ago Essential hypertension    Susy Newberry MD    Office Visit

## 2023-01-12 NOTE — TELEPHONE ENCOUNTER
Please instruct patient to go to [employer] and complete hemoglobin A1c (diabetes test). His diabetes was uncontrolled during the last blood test.  It is extremely important to control diabetes. Please inform him.

## 2023-01-18 RX ORDER — SILDENAFIL 100 MG/1
100 TABLET, FILM COATED ORAL AS NEEDED
Qty: 24 TABLET | Refills: 1 | Status: SHIPPED | OUTPATIENT
Start: 2023-01-18

## 2023-01-18 NOTE — TELEPHONE ENCOUNTER
Refill passed per [place] [employer] protocol. Requested Prescriptions   Pending Prescriptions Disp Refills    Sildenafil Citrate 100 MG Oral Tab 24 tablet 0     Sig: Take 1 tablet (100 mg total) by mouth as needed for Erectile Dysfunction.        Genitourinary Medications Passed - 1/18/2023 10:37 AM        Passed - Patient does not have pulmonary hypertension on problem list        Passed - In person appointment or virtual visit in the past 12 mos or appointment in next 3 mos     Recent Outpatient Visits              9 months ago Type 2 diabetes mellitus without retinopathy ([facility] [address]    [address] MD    Office Visit    1 year ago Person with feared complaint in whom no diagnosis was made    [facility], [address], [place]    Nurse Only    1 year ago Adult general medical exam    [address] [place] [address] MD    Office Visit    1 year ago Essential hypertension    [address] MD    Office Visit    1 year ago Essential hypertension    [address] MD    Office Visit

## 2023-02-02 ENCOUNTER — TELEPHONE (OUTPATIENT)
Facility: CLINIC | Age: 63
End: 2023-02-02

## 2023-02-02 DIAGNOSIS — E11.9 TYPE 2 DIABETES MELLITUS WITHOUT RETINOPATHY (HCC): Primary | ICD-10-CM

## 2023-02-02 RX ORDER — SILDENAFIL 100 MG/1
100 TABLET, FILM COATED ORAL AS NEEDED
Qty: 24 TABLET | Refills: 0 | OUTPATIENT
Start: 2023-02-02

## 2023-02-02 NOTE — TELEPHONE ENCOUNTER
He is due for blood testing. He has uncontrolled diabetes. Please inform patient that uncontrolled diabetes can lead to endorgan damage such as retinopathy, kidney damage etc.  It is extremely important that we control the diabetes. I have ordered the blood testing for him. Please let him complete it asap. Also, encourage patient to make an appointment to see me in the office in next couple of months.   Thank you

## 2023-02-19 LAB — HEMOGLOBIN A1C: 11.5 % OF TOTAL HGB

## 2023-04-18 ENCOUNTER — TELEPHONE (OUTPATIENT)
Dept: ENDOCRINOLOGY CLINIC | Facility: CLINIC | Age: 63
End: 2023-04-18

## 2023-04-18 ENCOUNTER — OFFICE VISIT (OUTPATIENT)
Dept: ENDOCRINOLOGY CLINIC | Facility: CLINIC | Age: 63
End: 2023-04-18

## 2023-04-18 VITALS
HEIGHT: 72 IN | BODY MASS INDEX: 30.75 KG/M2 | WEIGHT: 227 LBS | DIASTOLIC BLOOD PRESSURE: 73 MMHG | SYSTOLIC BLOOD PRESSURE: 123 MMHG | HEART RATE: 72 BPM

## 2023-04-18 DIAGNOSIS — E11.65 UNCONTROLLED TYPE 2 DIABETES MELLITUS WITH HYPERGLYCEMIA (HCC): Primary | ICD-10-CM

## 2023-04-18 LAB
CARTRIDGE LOT#: ABNORMAL NUMERIC
GLUCOSE BLOOD: 188
HEMOGLOBIN A1C: 10.9 % (ref 4.3–5.6)
TEST STRIP LOT #: NORMAL NUMERIC

## 2023-04-18 PROCEDURE — 3008F BODY MASS INDEX DOCD: CPT | Performed by: NURSE PRACTITIONER

## 2023-04-18 PROCEDURE — 3074F SYST BP LT 130 MM HG: CPT | Performed by: NURSE PRACTITIONER

## 2023-04-18 PROCEDURE — 82947 ASSAY GLUCOSE BLOOD QUANT: CPT | Performed by: NURSE PRACTITIONER

## 2023-04-18 PROCEDURE — 3046F HEMOGLOBIN A1C LEVEL >9.0%: CPT | Performed by: NURSE PRACTITIONER

## 2023-04-18 PROCEDURE — 3078F DIAST BP <80 MM HG: CPT | Performed by: NURSE PRACTITIONER

## 2023-04-18 PROCEDURE — 83036 HEMOGLOBIN GLYCOSYLATED A1C: CPT | Performed by: NURSE PRACTITIONER

## 2023-04-18 PROCEDURE — 99204 OFFICE O/P NEW MOD 45 MIN: CPT | Performed by: NURSE PRACTITIONER

## 2023-04-18 RX ORDER — BLOOD SUGAR DIAGNOSTIC
1 STRIP MISCELLANEOUS DAILY
Qty: 100 EACH | Refills: 1 | Status: SHIPPED | OUTPATIENT
Start: 2023-04-18

## 2023-04-18 RX ORDER — SEMAGLUTIDE 0.68 MG/ML
0.25 INJECTION, SOLUTION SUBCUTANEOUS WEEKLY
Qty: 9 ML | Refills: 0 | Status: SHIPPED | OUTPATIENT
Start: 2023-04-18

## 2023-04-18 RX ORDER — GLIPIZIDE 5 MG/1
5 TABLET ORAL
Qty: 90 TABLET | Refills: 0 | Status: SHIPPED | OUTPATIENT
Start: 2023-04-18 | End: 2023-07-17

## 2023-04-18 NOTE — PATIENT INSTRUCTIONS
A1C: 10.9% today -->decreased from 11.5% on 2/18/2023  Blood glucose: 188 in clinic today    Medications:   -continue with Metformin 1,000mg with breakfast            1,000mg with dinner   - start Glipizide 5mg with dinner   - start ozempic 0.25mg once weekly   Common side effects:    Nausea or diarrhea    These effects usually go away over time as your body gets used to the medicine      Here are some things that might help your nausea go away:    Eat small amounts of food instrad of few large meals   Eat plain, bland non greasy foods    Drink plenty of fluids (sugar free)    Avoid foods and smells that might make you sick    Eat slowly and listen to your hunger    .-lets work on limiting carbohydrates to 45-60gm per meal/ max 180gm per day  -avoid eating snacks between meals  -avoid eating sweets or soda/fruit juices or Gatorade or energy drinks   -eat dinner at least 2 hours prior to going to bed at night  - increase (aerobic) exercise to at least 4-5 days per week for at least 30 mins each session    -avoid going more than 2 consecutive days of no exercise      - please give me an update on your blood glucose readings in 3 weeks   - schedule an apt with Melva (diabetes educator) as soon as able to review low carb diet    Weight:  Wt Readings from Last 6 Encounters:  04/18/23 : 227 lb (103 kg)  04/21/22 : 233 lb (105.7 kg)  06/23/21 : 229 lb (103.9 kg)  05/06/21 : 236 lb (107 kg)  03/25/21 : 236 lb (107 kg)  12/22/20 : 241 lb (109.3 kg)    A1C goal:  <7.0%    Blood sugar testing:  Test your blood sugar 1 time daily   Recommended times to test: alternate with before breakfast (fasting) or 2hrs after meals     Blood sugar targets:  Before breakfast:  (preferably < 110)  2 hours after meals: <180 (preferably <150)     Call for persistent blood sugars < 75 or > 200

## 2023-04-24 ENCOUNTER — TELEPHONE (OUTPATIENT)
Dept: ENDOCRINOLOGY CLINIC | Facility: CLINIC | Age: 63
End: 2023-04-24

## 2023-04-24 NOTE — TELEPHONE ENCOUNTER
PRIOR AUTHORIZATION:  Semaglutide,0.25 or 0.5MG/DOS, (OZEMPIC, 0.25 OR 0.5 MG/DOSE,) 2 MG/3ML Subcutaneous Solution Pen-injector, Inject 0.25 mg into the skin once a week., Disp: 9 mL, Rfl: 0    KEY: [id number]

## 2023-04-25 NOTE — TELEPHONE ENCOUNTER
Medication PA Requested: Semaglutide,0.25 or 0.5MG/DOS, (OZEMPIC, 0.25 OR 0.5 MG/DOSE,) 2 MG/3ML Subcutaneous Solution Pen-injector                                                         [employer]eds Used:  Key:  Quantity: 9 ml  Day Supply:  Sig: Inject 0.25 mg into the skin once a week.   DX Code: E11.65                                     CPT code (if applicable):   Case Number/Pending Ref#:

## 2023-04-27 NOTE — TELEPHONE ENCOUNTER
Medication PA Requested: Semaglutide,0.25 or 0.5MG/DOS, (OZEMPIC, 0.25 OR 0.5 MG/DOSE,) 2 MG/3ML Subcutaneous Solution Pen-injector                                                         [employer]eds Used:  Key:[id number]  Quantity: 9 ml  Day Supply:  Sig: Inject 0.25 mg into the skin once a week.   DX Code: E11.65    epa submitted with LOV, A1c 4/18/2023  Awaiting determination from [employer]

## 2023-05-02 NOTE — TELEPHONE ENCOUNTER
Received fax from [employer] regarding ozempic. The request for ozempic has been granted 4/29/23 and ends 4/29/24. Case # [id number]    Sent to scanning.

## 2023-05-03 NOTE — TELEPHONE ENCOUNTER
Mc, [phone], spoke with Dimas Pool, states ozempic went through.     My chart message sent to patient of approval.

## 2023-06-22 ENCOUNTER — OFFICE VISIT (OUTPATIENT)
Facility: CLINIC | Age: 63
End: 2023-06-22

## 2023-06-22 VITALS
RESPIRATION RATE: 14 BRPM | DIASTOLIC BLOOD PRESSURE: 70 MMHG | HEART RATE: 76 BPM | SYSTOLIC BLOOD PRESSURE: 130 MMHG | BODY MASS INDEX: 30.88 KG/M2 | HEIGHT: 72 IN | WEIGHT: 228 LBS | OXYGEN SATURATION: 98 %

## 2023-06-22 DIAGNOSIS — Z12.11 SCREEN FOR COLON CANCER: ICD-10-CM

## 2023-06-22 DIAGNOSIS — E11.9 TYPE 2 DIABETES MELLITUS WITHOUT RETINOPATHY (HCC): ICD-10-CM

## 2023-06-22 DIAGNOSIS — Z00.00 ADULT GENERAL MEDICAL EXAM: Primary | ICD-10-CM

## 2023-06-22 PROCEDURE — 3075F SYST BP GE 130 - 139MM HG: CPT | Performed by: INTERNAL MEDICINE

## 2023-06-22 PROCEDURE — 99396 PREV VISIT EST AGE 40-64: CPT | Performed by: INTERNAL MEDICINE

## 2023-06-22 PROCEDURE — 3008F BODY MASS INDEX DOCD: CPT | Performed by: INTERNAL MEDICINE

## 2023-06-22 PROCEDURE — 3078F DIAST BP <80 MM HG: CPT | Performed by: INTERNAL MEDICINE

## 2023-06-25 PROCEDURE — 82274 ASSAY TEST FOR BLOOD FECAL: CPT

## 2023-06-26 ENCOUNTER — LAB ENCOUNTER (OUTPATIENT)
Dept: LAB | Facility: HOSPITAL | Age: 63
End: 2023-06-26
Attending: INTERNAL MEDICINE
Payer: COMMERCIAL

## 2023-06-26 DIAGNOSIS — Z00.00 ADULT GENERAL MEDICAL EXAM: ICD-10-CM

## 2023-06-26 DIAGNOSIS — E11.9 TYPE 2 DIABETES MELLITUS WITHOUT RETINOPATHY (HCC): ICD-10-CM

## 2023-06-26 DIAGNOSIS — Z12.11 SCREEN FOR COLON CANCER: ICD-10-CM

## 2023-06-26 LAB
ALBUMIN SERPL-MCNC: 3.8 G/DL (ref 3.4–5)
ALBUMIN/GLOB SERPL: 1 {RATIO} (ref 1–2)
ALP LIVER SERPL-CCNC: 64 U/L
ALT SERPL-CCNC: 28 U/L
ANION GAP SERPL CALC-SCNC: 8 MMOL/L (ref 0–18)
AST SERPL-CCNC: 24 U/L (ref 15–37)
BILIRUB SERPL-MCNC: 0.4 MG/DL (ref 0.1–2)
BUN BLD-MCNC: 12 MG/DL (ref 7–18)
BUN/CREAT SERPL: 14.5 (ref 10–20)
CALCIUM BLD-MCNC: 8.9 MG/DL (ref 8.5–10.1)
CHLORIDE SERPL-SCNC: 106 MMOL/L (ref 98–112)
CHOLEST SERPL-MCNC: 153 MG/DL (ref ?–200)
CO2 SERPL-SCNC: 26 MMOL/L (ref 21–32)
COMPLEXED PSA SERPL-MCNC: 4.32 NG/ML (ref ?–4)
CREAT BLD-MCNC: 0.83 MG/DL
CREAT UR-SCNC: 219 MG/DL
DEPRECATED RDW RBC AUTO: 42.1 FL (ref 35.1–46.3)
ERYTHROCYTE [DISTWIDTH] IN BLOOD BY AUTOMATED COUNT: 12.8 % (ref 11–15)
EST. AVERAGE GLUCOSE BLD GHB EST-MCNC: 186 MG/DL (ref 68–126)
FASTING PATIENT LIPID ANSWER: YES
FASTING STATUS PATIENT QL REPORTED: YES
GFR SERPLBLD BASED ON 1.73 SQ M-ARVRAT: 100 ML/MIN/1.73M2 (ref 60–?)
GLOBULIN PLAS-MCNC: 3.8 G/DL (ref 2.8–4.4)
GLUCOSE BLD-MCNC: 98 MG/DL (ref 70–99)
HBA1C MFR BLD: 8.1 % (ref ?–5.7)
HCT VFR BLD AUTO: 38.8 %
HDLC SERPL-MCNC: 42 MG/DL (ref 40–59)
HEMOCCULT STL QL: NEGATIVE
HGB BLD-MCNC: 12.9 G/DL
LDLC SERPL CALC-MCNC: 90 MG/DL (ref ?–100)
MCH RBC QN AUTO: 29.9 PG (ref 26–34)
MCHC RBC AUTO-ENTMCNC: 33.2 G/DL (ref 31–37)
MCV RBC AUTO: 89.8 FL
MICROALBUMIN UR-MCNC: 53.7 MG/DL
MICROALBUMIN/CREAT 24H UR-RTO: 245.2 UG/MG (ref ?–30)
NONHDLC SERPL-MCNC: 111 MG/DL (ref ?–130)
OSMOLALITY SERPL CALC.SUM OF ELEC: 290 MOSM/KG (ref 275–295)
PLATELET # BLD AUTO: 238 10(3)UL (ref 150–450)
POTASSIUM SERPL-SCNC: 3.6 MMOL/L (ref 3.5–5.1)
PROT SERPL-MCNC: 7.6 G/DL (ref 6.4–8.2)
RBC # BLD AUTO: 4.32 X10(6)UL
SODIUM SERPL-SCNC: 140 MMOL/L (ref 136–145)
TRIGL SERPL-MCNC: 114 MG/DL (ref 30–149)
TSI SER-ACNC: 2.53 MIU/ML (ref 0.36–3.74)
VLDLC SERPL CALC-MCNC: 18 MG/DL (ref 0–30)
WBC # BLD AUTO: 7.2 X10(3) UL (ref 4–11)

## 2023-06-26 PROCEDURE — 82570 ASSAY OF URINE CREATININE: CPT | Performed by: INTERNAL MEDICINE

## 2023-06-26 PROCEDURE — 83036 HEMOGLOBIN GLYCOSYLATED A1C: CPT

## 2023-06-26 PROCEDURE — 36415 COLL VENOUS BLD VENIPUNCTURE: CPT

## 2023-06-26 PROCEDURE — 84443 ASSAY THYROID STIM HORMONE: CPT

## 2023-06-26 PROCEDURE — 80061 LIPID PANEL: CPT | Performed by: INTERNAL MEDICINE

## 2023-06-26 PROCEDURE — 80053 COMPREHEN METABOLIC PANEL: CPT

## 2023-06-26 PROCEDURE — 85027 COMPLETE CBC AUTOMATED: CPT

## 2023-06-26 PROCEDURE — 82043 UR ALBUMIN QUANTITATIVE: CPT | Performed by: INTERNAL MEDICINE

## 2023-06-27 ENCOUNTER — TELEPHONE (OUTPATIENT)
Dept: ENDOCRINOLOGY CLINIC | Facility: CLINIC | Age: 63
End: 2023-06-27

## 2023-06-27 RX ORDER — SEMAGLUTIDE 0.68 MG/ML
0.25 INJECTION, SOLUTION SUBCUTANEOUS WEEKLY
Qty: 9 ML | Refills: 0 | Status: SHIPPED | OUTPATIENT
Start: 2023-06-27

## 2023-06-28 DIAGNOSIS — R97.20 ELEVATED PSA: Primary | ICD-10-CM

## 2023-07-17 RX ORDER — GLIPIZIDE 5 MG/1
5 TABLET ORAL
Qty: 90 TABLET | Refills: 0 | Status: SHIPPED | OUTPATIENT
Start: 2023-07-17

## 2023-07-17 NOTE — TELEPHONE ENCOUNTER
LOV: 4/18/23    RTC: 6 weeks    FU: No FU Appt Scheduled    3 Month Supply Pending    [facility]

## 2023-08-03 DIAGNOSIS — Z00.00 ADULT GENERAL MEDICAL EXAM: ICD-10-CM

## 2023-08-04 RX ORDER — AMLODIPINE BESYLATE 10 MG/1
10 TABLET ORAL DAILY
Qty: 90 TABLET | Refills: 3 | Status: SHIPPED | OUTPATIENT
Start: 2023-08-04

## 2023-08-04 RX ORDER — ATORVASTATIN CALCIUM 10 MG/1
10 TABLET, FILM COATED ORAL DAILY
Qty: 90 TABLET | Refills: 3 | Status: SHIPPED | OUTPATIENT
Start: 2023-08-04

## 2023-08-04 NOTE — TELEPHONE ENCOUNTER
Please review. Protocol Failed or has No Protocol. Requested Prescriptions   Pending Prescriptions Disp Refills    metFORMIN HCl 1000 MG Oral Tab [Pharmacy Med Name: METFORMIN 1000MG TABLETS] 180 tablet 0     Sig: Take 1 tablet (1,000 mg total) by mouth 2 (two) times daily with meals. Diabetes Medication Protocol Failed - 8/3/2023  4:06 PM        Failed - Last A1C < 7.5 and within past 6 months     Lab Results   Component Value Date    A1C 8.1 (H) 06/26/2023             Passed - In person appointment or virtual visit in the past 6 mos or appointment in next 3 mos     Recent Outpatient Visits              1 month ago Adult general medical exam    Grace Adames MD    Office Visit    3 months ago Uncontrolled type 2 diabetes mellitus with hyperglycemia [facility])    [address], [address] [facility], [facility], [facility]    Office Visit    1 year ago Type 2 diabetes mellitus without retinopathy ([facility] [address]    [address], [facility]    Office Visit    2 years ago Person with feared complaint in whom no diagnosis was made    [address], [address], [place]    Nurse Only    2 years ago Adult general medical exam    Susy Rizvi MD    Office Visit          Future Appointments         Provider Department Appt Notes    In 4 months Moon Lai MD [address], [facility] Return in about 6 months (around 12/22/2023). Passed - EGFRCR or GFRAA > 50     GFR Evaluation  EGFRCR: 100 , resulted on 6/26/2023          Passed - GFR in the past 12 months         Signed Prescriptions Disp Refills    atorvastatin 10 MG Oral Tab 90 tablet 3     Sig: Take 1 tablet (10 mg total) by mouth daily.        Cholesterol Medication Protocol Passed - 8/3/2023  4:06 PM        Passed - ALT in past 12 months        Passed - LDL in past 12 months        Passed - Last ALT < 80     Lab Results   Component Value Date    ALT 28 06/26/2023             Passed - Last LDL < 130     Lab Results   Component Value Date    LDL 90 06/26/2023             Passed - In person appointment or virtual visit in the past 12 mos or appointment in next 3 mos     Recent Outpatient Visits              1 month ago Adult general medical exam    Susy Pierson MD    Office Visit    3 months ago Uncontrolled type 2 diabetes mellitus with hyperglycemia [facility])    Юлия Baron, [address] [place], [place] [address], [place]    Office Visit    1 year ago Type 2 diabetes mellitus without retinopathy ([facility] [address]    [facility]    Office Visit    2 years ago Person with feared complaint in whom no diagnosis was made    [address]лия Baron, [address] [facility],[address], [place]    Nurse Only    2 years ago Adult general medical exam    Susy Schroeder MD    Office Visit          Future Appointments         Provider Department Appt Notes    In 4 months MD [facility] Return in about 6 months (around 12/22/2023). amLODIPine 10 MG Oral Tab 90 tablet 3     Sig: Take 1 tablet (10 mg total) by mouth daily.        Hypertensive Medications Protocol Passed - 8/3/2023  4:06 PM        Passed - In person appointment in the past 12 or next 3 months     Recent Outpatient Visits              1 month ago Adult general medical exam    Susy Pierson MD    Office Visit    3 months ago Uncontrolled type 2 diabetes mellitus with hyperglycemia [facility])    [address] Baron, [address] [place] [address], [place], [place]    Office Visit    1 year ago Type 2 diabetes mellitus without retinopathy ([facility] [address]    Clifford Aguilar, [address], [place]    Office Visit    2 years ago Person with feared complaint in whom no diagnosis was made    Cl[address] Aguilar, [address], [place]    Nurse Only    2 years ago Adult general medical exam    Susy Amaya MD    Office Visit          Future Appointments         Provider Department Appt Notes    In 4 months MD Clifford Nielsen [address] 84 Return in about 6 months (around 12/22/2023). Passed - Last BP reading less than 140/90     BP Readings from Last 1 Encounters:  06/22/23 : 130/70              Passed - CMP or BMP in past 6 months     Recent Results (from the past 4392 hour(s))   COMP METABOLIC PANEL (14)    Collection Time: 06/26/23 10:50 AM   Result Value Ref Range    Glucose 98 70 - 99 mg/dL    Sodium 140 136 - 145 mmol/L    Potassium 3.6 3.5 - 5.1 mmol/L    Chloride 106 98 - 112 mmol/L    CO2 26.0 21.0 - 32.0 mmol/L    Anion Gap 8 0 - 18 mmol/L    BUN 12 7 - 18 mg/dL    Creatinine 0.83 0.70 - 1.30 mg/dL    BUN/CREA Ratio 14.5 10.0 - 20.0    Calcium, Total 8.9 8.5 - 10.1 mg/dL    Calculated Osmolality 290 275 - 295 mOsm/kg    eGFR-Cr 100 >=60 mL/min/1.73m2    ALT 28 16 - 61 U/L    AST 24 15 - 37 U/L    Alkaline Phosphatase 64 45 - 117 U/L    Bilirubin, Total 0.4 0.1 - 2.0 mg/dL    Total Protein 7.6 6.4 - 8.2 g/dL    Albumin 3.8 3.4 - 5.0 g/dL    Globulin  3.8 2.8 - 4.4 g/dL    A/G Ratio 1.0 1.0 - 2.0    Patient Fasting for CMP? Yes      *Note: Due to a large number of results and/or encounters for the requested time period, some results have not been displayed. A complete set of results can be found in Results Review.                Passed - In person appointment or virtual visit in the past 6 months     Recent Outpatient Visits              1 month ago Adult general medical exam    [employer], [place] [address] [facility], MD Susy    Office Visit    3 months ago Uncontrolled type 2 diabetes mellitus with hyperglycemia [facility])    Yasmin Persaud, [address] [facility], [facility], [facility]    Office Visit    1 year ago Type 2 diabetes mellitus without retinopathy ([facility] [address]    Yasmin Persaud, [address], [place]    Office Visit    2 years ago Person with feared complaint in whom no diagnosis was made    Yasmin Persaud, [address], [place]    Nurse Only    2 years ago Adult general medical exam    [address] [place] [address], MD Susy    Office Visit          Future Appointments         Provider Department Appt Notes    In 4 months MD Yasmin Medel [address] Return in about 6 months (around 12/22/2023).                Passed - EGFRCR or GFRAA > 50     GFR Evaluation  EGFRCR: 100 , resulted on 6/26/2023               Recent Outpatient Visits              1 month ago Adult general medical exam    [facility]min Persaud, [facility]    Office Visit    3 months ago Uncontrolled type 2 diabetes mellitus with hyperglycemia [facility])    Yasmin Persaud, [address] [facility], [facility]    Office Visit    1 year ago Type 2 diabetes mellitus without retinopathy ([facility] [address]    Yasmin Persaud, [address], [place]    Office Visit    2 years ago Person with feared complaint in whom no diagnosis was made    Yasmin Persaud, [address], [place]    Nurse Only    2 years ago Adult general medical exam    Yasmin Persaud, [address] MD    Office Visit            Future Appointments         Provider Department Appt Notes    In 4 months Carrillo Calix MD [facility], [facility] 84 Return in about 6 months (around 12/22/2023).

## 2023-08-04 NOTE — TELEPHONE ENCOUNTER
Refill passed per [employer], [facility] protocol. Requested Prescriptions   Pending Prescriptions Disp Refills    METFORMIN HCL 1000 MG Oral Tab [Pharmacy Med Name: METFORMIN 1000MG TABLETS] 180 tablet 0     Sig: TAKE 1 TABLET(1000 MG) BY MOUTH TWICE DAILY WITH MEALS       Diabetes Medication Protocol Failed - 8/3/2023  4:06 PM        Failed - Last A1C < 7.5 and within past 6 months     Lab Results   Component Value Date    A1C 8.1 (H) 06/26/2023             Passed - In person appointment or virtual visit in the past 6 mos or appointment in next 3 mos     Recent Outpatient Visits              1 month ago Adult general medical exam    [address], [address] [facility], [place] Susy    Office Visit    3 months ago Uncontrolled type 2 diabetes mellitus with hyperglycemia [facility])    [address], [address] [facility], [place]    Office Visit    1 year ago Type 2 diabetes mellitus without retinopathy ([facility] [address]    [address]    Office Visit    2 years ago Person with feared complaint in whom no diagnosis was made    [address], [address], [place]    Nurse Only    2 years ago Adult general medical exam    [place] Slaughter MD    Office Visit          Future Appointments         Provider Department Appt Notes    In 4 months Margie Cutler MD [address], [facility] 84 Return in about 6 months (around 12/22/2023).                Passed - EGFRCR or GFRAA > 50     GFR Evaluation  EGFRCR: 100 , resulted on 6/26/2023          Passed - GFR in the past 12 months          ATORVASTATIN 10 MG Oral Tab [Pharmacy Med Name: ATORVASTATIN 10MG TABLETS] 90 tablet 0     Sig: TAKE 1 TABLET(10 MG) BY MOUTH DAILY       Cholesterol Medication Protocol Passed - 8/3/2023  4:06 PM        Passed - ALT in past 12 months Passed - LDL in past 12 months        Passed - Last ALT < 80     Lab Results   Component Value Date    ALT 28 06/26/2023             Passed - Last LDL < 130     Lab Results   Component Value Date    LDL 90 06/26/2023             Passed - In person appointment or virtual visit in the past 12 mos or appointment in next 3 mos     Recent Outpatient Visits              1 month ago Adult general medical exam    Rashaun Park MD    Office Visit    3 months ago Uncontrolled type 2 diabetes mellitus with hyperglycemia [facility])    Renee Syed [address] [place], [place] [address]Lelia Torrington, APRN    Office Visit    1 year ago Type 2 diabetes mellitus without retinopathy ([facility] [address]    [facility]    Office Visit    2 years ago Person with feared complaint in whom no diagnosis was made    Renee Syed, [address], [place]    Nurse Only    2 years ago [facility] medical [facility]    [facility]    [facility]          Future Appointments         Provider Department Appt Notes    In 4 months MD Renee [place] [address] Return in about 6 months (around 12/22/2023).                  AMLODIPINE 10 MG Oral Tab [Pharmacy Med Name: AMLODIPINE BESYLATE 10MG TABLETS] 90 tablet 0     Sig: TAKE 1 TABLET(10 MG) BY MOUTH DAILY       Hypertensive Medications Protocol Passed - 8/3/2023  4:06 PM        Passed - In person appointment in the past 12 or next 3 months     Recent Outpatient Visits              1 month ago Adult general medical exam    Rashaun Park MD    Office Visit    3 months ago Uncontrolled type 2 diabetes mellitus with hyperglycemia [facility])    Jennifer Hicks [facility], Pranav Rowell, [place] Office Visit    1 year ago Type 2 diabetes mellitus without retinopathy ([facility] [address]    Twila Jett, [address], [place]    Office Visit    2 years ago Person with feared complaint in whom no diagnosis was made    Twila Maliha, [address], [place]    Nurse Only    2 years ago Adult general medical exam    [place]y Preston MD    Office Visit          Future Appointments         Provider Department Appt Notes    In 4 months MD Twila [place] [address] 84 Return in about 6 months (around 12/22/2023). Passed - Last BP reading less than 140/90     BP Readings from Last 1 Encounters:  06/22/23 : 130/70              Passed - CMP or BMP in past 6 months     Recent Results (from the past 4392 hour(s))   COMP METABOLIC PANEL (14)    Collection Time: 06/26/23 10:50 AM   Result Value Ref Range    Glucose 98 70 - 99 mg/dL    Sodium 140 136 - 145 mmol/L    Potassium 3.6 3.5 - 5.1 mmol/L    Chloride 106 98 - 112 mmol/L    CO2 26.0 21.0 - 32.0 mmol/L    Anion Gap 8 0 - 18 mmol/L    BUN 12 7 - 18 mg/dL    Creatinine 0.83 0.70 - 1.30 mg/dL    BUN/CREA Ratio 14.5 10.0 - 20.0    Calcium, Total 8.9 8.5 - 10.1 mg/dL    Calculated Osmolality 290 275 - 295 mOsm/kg    eGFR-Cr 100 >=60 mL/min/1.73m2    ALT 28 16 - 61 U/L    AST 24 15 - 37 U/L    Alkaline Phosphatase 64 45 - 117 U/L    Bilirubin, Total 0.4 0.1 - 2.0 mg/dL    Total Protein 7.6 6.4 - 8.2 g/dL    Albumin 3.8 3.4 - 5.0 g/dL    Globulin  3.8 2.8 - 4.4 g/dL    A/G Ratio 1.0 1.0 - 2.0    Patient Fasting for CMP? Yes      *Note: Due to a large number of results and/or encounters for the requested time period, some results have not been displayed. A complete set of results can be found in Results Review.                Passed - In person appointment or virtual visit in the past 6 months     Recent Outpatient Visits 1 month ago Adult general medical exam    [address], [address], [place], [place]    Office Visit    3 months ago Uncontrolled type 2 diabetes mellitus with hyperglycemia [facility])    [address], [address] [facility]    Office Visit    1 year ago Type 2 diabetes mellitus without retinopathy ([facility] [address]    [address], [address], [place]    Office Visit    2 years ago Person with feared complaint in whom no diagnosis was made    [address], [address], [place]    Nurse Only    2 years ago Adult general medical exam    Susy Stearns MD    Office Visit          Future Appointments         Provider Department Appt Notes    In 4 months aClly Syed MD [address], [facility] 84 Return in about 6 months (around 12/22/2023).                Passed - EGFRCR or GFRAA > 50     GFR Evaluation  EGFRCR: 100 , resulted on 6/26/2023             Recent Outpatient Visits              1 month ago Adult general medical exam    [address], [address], [place], [place]    Office Visit    3 months ago Uncontrolled type 2 diabetes mellitus with hyperglycemia [facility])    [address], [address] [facility]    Office Visit    1 year ago Type 2 diabetes mellitus without retinopathy ([facility] [address]    Susy Stearns MD    Office Visit    2 years ago Person with feared complaint in whom no diagnosis was made    [address], [address], [place]    Nurse Only    2 years ago Adult general medical exam    Susy Stearns MD    Office Visit          Future Appointments         Provider Department Appt Notes    In 4 months Rohan Thomas MD Twila Medina [address] Return in about 6 months (around 12/22/2023).

## 2023-12-22 ENCOUNTER — TELEPHONE (OUTPATIENT)
Dept: FAMILY MEDICINE CLINIC | Facility: CLINIC | Age: 63
End: 2023-12-22

## 2023-12-22 NOTE — TELEPHONE ENCOUNTER
Left detailed vm for patient to call back and schedule DM eye exam, also provided information on how to fax results if done at another facility. A [employer] message was also sent with this information.

## 2024-01-20 ENCOUNTER — IMMUNIZATION (OUTPATIENT)
Dept: LAB | Age: 64
End: 2024-01-20
Attending: EMERGENCY MEDICINE
Payer: COMMERCIAL

## 2024-01-20 DIAGNOSIS — Z23 NEED FOR VACCINATION: Primary | ICD-10-CM

## 2024-01-20 PROCEDURE — 90480 ADMN SARSCOV2 VAC 1/ONLY CMP: CPT

## 2024-02-26 RX ORDER — SEMAGLUTIDE 0.68 MG/ML
0.25 INJECTION, SOLUTION SUBCUTANEOUS WEEKLY
Qty: 9 ML | Refills: 0 | Status: SHIPPED | OUTPATIENT
Start: 2024-02-26

## 2024-04-18 NOTE — TELEPHONE ENCOUNTER
Debo here for lab draw.     Port accessed and de-accessed per [facility] procedure.   See flowsheets and MAR for details.   Patient tolerated procedure well.    Debo will be notified of lab results. Patient discharged in stable, ambulatory condition..     Spoke to patient and informed him that omega-3-acid ethyl esters isn't covered and prior authorization was denied. He is to take fish oil 1 g BID otc.

## 2024-05-03 ENCOUNTER — LAB ENCOUNTER (OUTPATIENT)
Dept: LAB | Facility: HOSPITAL | Age: 64
End: 2024-05-03
Attending: INTERNAL MEDICINE
Payer: COMMERCIAL

## 2024-05-03 ENCOUNTER — OFFICE VISIT (OUTPATIENT)
Facility: CLINIC | Age: 64
End: 2024-05-03

## 2024-05-03 VITALS
WEIGHT: 226 LBS | HEART RATE: 86 BPM | HEIGHT: 72 IN | SYSTOLIC BLOOD PRESSURE: 122 MMHG | DIASTOLIC BLOOD PRESSURE: 72 MMHG | OXYGEN SATURATION: 97 % | BODY MASS INDEX: 30.61 KG/M2

## 2024-05-03 DIAGNOSIS — Z00.00 ADULT GENERAL MEDICAL EXAM: ICD-10-CM

## 2024-05-03 DIAGNOSIS — Z12.11 SCREEN FOR COLON CANCER: ICD-10-CM

## 2024-05-03 DIAGNOSIS — E11.9 TYPE 2 DIABETES MELLITUS WITHOUT RETINOPATHY (HCC): Primary | ICD-10-CM

## 2024-05-03 DIAGNOSIS — E11.9 TYPE 2 DIABETES MELLITUS WITHOUT RETINOPATHY (HCC): ICD-10-CM

## 2024-05-03 DIAGNOSIS — L30.9 ECZEMA, UNSPECIFIED TYPE: ICD-10-CM

## 2024-05-03 LAB
ALBUMIN SERPL-MCNC: 4.7 G/DL (ref 3.2–4.8)
ALBUMIN/GLOB SERPL: 1.5 {RATIO} (ref 1–2)
ALP LIVER SERPL-CCNC: 70 U/L
ALT SERPL-CCNC: 20 U/L
ANION GAP SERPL CALC-SCNC: 9 MMOL/L (ref 0–18)
AST SERPL-CCNC: 23 U/L (ref ?–34)
BILIRUB SERPL-MCNC: 0.7 MG/DL (ref 0.2–1.1)
BUN BLD-MCNC: 14 MG/DL (ref 9–23)
BUN/CREAT SERPL: 14.9 (ref 10–20)
CALCIUM BLD-MCNC: 9.7 MG/DL (ref 8.7–10.4)
CHLORIDE SERPL-SCNC: 106 MMOL/L (ref 98–112)
CHOLEST SERPL-MCNC: 137 MG/DL (ref ?–200)
CO2 SERPL-SCNC: 25 MMOL/L (ref 21–32)
COMPLEXED PSA SERPL-MCNC: 4.59 NG/ML (ref ?–4)
CREAT BLD-MCNC: 0.94 MG/DL
CREAT UR-SCNC: 141.4 MG/DL
DEPRECATED RDW RBC AUTO: 41.1 FL (ref 35.1–46.3)
EGFRCR SERPLBLD CKD-EPI 2021: 92 ML/MIN/1.73M2 (ref 60–?)
ERYTHROCYTE [DISTWIDTH] IN BLOOD BY AUTOMATED COUNT: 12.4 % (ref 11–15)
FASTING PATIENT LIPID ANSWER: YES
FASTING STATUS PATIENT QL REPORTED: YES
GLOBULIN PLAS-MCNC: 3.2 G/DL (ref 2–3.5)
GLUCOSE BLD-MCNC: 108 MG/DL (ref 70–99)
HCT VFR BLD AUTO: 39.3 %
HDLC SERPL-MCNC: 43 MG/DL (ref 40–59)
HEMOGLOBIN A1C: 7.1 % (ref 4.3–5.6)
HGB BLD-MCNC: 13.5 G/DL
LDLC SERPL CALC-MCNC: 78 MG/DL (ref ?–100)
MCH RBC QN AUTO: 30.8 PG (ref 26–34)
MCHC RBC AUTO-ENTMCNC: 34.4 G/DL (ref 31–37)
MCV RBC AUTO: 89.7 FL
MICROALBUMIN UR-MCNC: 30.3 MG/DL
MICROALBUMIN/CREAT 24H UR-RTO: 214.3 UG/MG (ref ?–30)
NONHDLC SERPL-MCNC: 94 MG/DL (ref ?–130)
OSMOLALITY SERPL CALC.SUM OF ELEC: 291 MOSM/KG (ref 275–295)
PLATELET # BLD AUTO: 248 10(3)UL (ref 150–450)
POTASSIUM SERPL-SCNC: 4.3 MMOL/L (ref 3.5–5.1)
PROT SERPL-MCNC: 7.9 G/DL (ref 5.7–8.2)
RBC # BLD AUTO: 4.38 X10(6)UL
SODIUM SERPL-SCNC: 140 MMOL/L (ref 136–145)
TRIGL SERPL-MCNC: 79 MG/DL (ref 30–149)
TSI SER-ACNC: 1.2 MIU/ML (ref 0.55–4.78)
VLDLC SERPL CALC-MCNC: 12 MG/DL (ref 0–30)
WBC # BLD AUTO: 7.3 X10(3) UL (ref 4–11)

## 2024-05-03 PROCEDURE — 82570 ASSAY OF URINE CREATININE: CPT

## 2024-05-03 PROCEDURE — 36415 COLL VENOUS BLD VENIPUNCTURE: CPT

## 2024-05-03 PROCEDURE — 80053 COMPREHEN METABOLIC PANEL: CPT

## 2024-05-03 PROCEDURE — 82043 UR ALBUMIN QUANTITATIVE: CPT

## 2024-05-03 PROCEDURE — 80061 LIPID PANEL: CPT

## 2024-05-03 PROCEDURE — 84443 ASSAY THYROID STIM HORMONE: CPT

## 2024-05-03 PROCEDURE — 85027 COMPLETE CBC AUTOMATED: CPT

## 2024-05-03 NOTE — PROGRESS NOTES
Marcello Melendez is a 64 year old male.  Chief Complaint   Patient presents with    Physical     HPI:   64-year-old gentleman here for physical.  He report that he is doing well.  Denies any chest pain or shortness of breath.  Taking medications regularly.    Past medical history-diabetes, hypertension, dyslipidemia     Past surgical history appendectomy.     Family history-he denies any prostate cancer or colon cancer in the family.  He denies any premature heart disease or venous thromboembolic disease.     He does not smoke, denies alcohol or recreational drug abuse.     He is not allergic to the medication.    Current Outpatient Medications   Medication Sig Dispense Refill    OZEMPIC, 0.25 OR 0.5 MG/DOSE, 2 MG/3ML Subcutaneous Solution Pen-injector INJECT 0.25 MG INTO THE SKIN ONCE A WEEK 9 mL 0    metFORMIN HCl 1000 MG Oral Tab Take 1 tablet (1,000 mg total) by mouth 2 (two) times daily with meals. 180 tablet 3    atorvastatin 10 MG Oral Tab Take 1 tablet (10 mg total) by mouth daily. 90 tablet 3    amLODIPine 10 MG Oral Tab Take 1 tablet (10 mg total) by mouth daily. 90 tablet 3    Sildenafil Citrate 100 MG Oral Tab Take 1 tablet (100 mg total) by mouth as needed for Erectile Dysfunction. 24 tablet 3    ONETOUCH ULTRA In Vitro Strip 1 each by Other route daily. 100 each 1    Omega-3-acid Ethyl Esters 1 g Oral Cap Take 1 capsule (1 g total) by mouth daily. 90 capsule 3    Blood Glucose Monitoring Suppl (ONETOUCH ULTRA 2) w/Device Does not apply Kit Use as directed 1 kit 0    Lancets (ONETOUCH DELICA PLUS [id number]) Does not apply Misc 1 strip by In Vitro route daily.      Multiple Vitamins-Minerals (CENTRUM SILVER) Oral Tab Take 1 tablet by mouth daily.        Past Medical History:    Pre-diabetes      Past Surgical History:   Procedure Laterality Date    Appendectomy      Us biopsy renal right (cpt=76942/[id number])        Social History:  Social History     Socioeconomic History    Marital status:    Tobacco  Use    Smoking status: Never    Smokeless tobacco: Never   Vaping Use    Vaping status: Never Used   Substance and Sexual Activity    Alcohol use: Never    Drug use: Never      Family History   Problem Relation Age of Onset    Diabetes Neg     Glaucoma Neg     Macular degeneration Neg       No Known Allergies     REVIEW OF SYSTEMS:   Review of Systems   Review of Systems   Constitutional: Negative for activity change, appetite change and fever.   HENT: Negative for congestion and voice change.    Respiratory: Negative for cough and shortness of breath.    Cardiovascular: Negative for chest pain.   Gastrointestinal: Negative for abdominal distention, abdominal pain and vomiting.   Genitourinary: Negative for hematuria.   Skin: Negative for wound.   Psychiatric/Behavioral: Negative for behavioral problems.   Wt Readings from Last 5 Encounters:   05/03/24 226 lb (102.5 kg)   06/22/23 228 lb (103.4 kg)   04/18/23 227 lb (103 kg)   04/21/22 233 lb (105.7 kg)   06/23/21 229 lb (103.9 kg)     Body mass index is 30.65 kg/m².      EXAM:   /72   Pulse 86   Ht 6' (1.829 m)   Wt 226 lb (102.5 kg)   SpO2 97%   BMI 30.65 kg/m²   Physical Exam   Constitutional:       Appearance: Normal appearance.   HENT:      Head: Normocephalic.   Eyes:      Conjunctiva/sclera: Conjunctivae normal.   Cardiovascular:      Rate and Rhythm: Normal rate and regular rhythm.      Heart sounds: Normal heart sounds. No murmur heard.  Pulmonary:      Effort: Pulmonary effort is normal.      Breath sounds: Normal breath sounds. No rhonchi or rales.   Abdominal:      General: Bowel sounds are normal.      Palpations: Abdomen is soft.      Tenderness: There is no abdominal tenderness.   Musculoskeletal:      Cervical back: Neck supple.      Right lower leg: No edema.      Left lower leg: No edema.   Skin:     General: Skin is warm and dry.   Neurological:      General: No focal deficit present.      Mental Status: He is alert and oriented to  person, place, and time. Mental status is at baseline.   Psychiatric:         Mood and Affect: Mood normal.         Behavior: Behavior normal.       ASSESSMENT AND PLAN:   1. Adult general medical exam  Eat healthy.  Foods and vegetables.  Exercise regularly.  Referral given for colonoscopy.  PSA ordered.  - Microalb/Creat Ratio, Random Urine; Future    2. Type 2 diabetes mellitus without retinopathy (HCC)  Continue to monitor A1c.  Ophthalmology referral given.  Foot exam up-to-date.  Continue statins.  - CBC, Platelet; No Differential; Future  - Comp Metabolic Panel (14); Future  - Lipid Panel; Future  - TSH W Reflex To Free T4; Future  - PSA Total, Screen; Future  - Microalb/Creat Ratio, Random Urine; Future  - Ophthalmology Referral - External  - HEMOGLOBIN A1C  - Endocrine Referral - In Network    3. Screen for colon cancer    - Occult Blood, Fecal, FIT Immunoassay; Future  - GASTRO - INTERNAL    4. Eczema, unspecified type    - Derm Referral - [facility])    Plan: As above.  I will see him back in 6 months      The patient indicates understanding of these issues and agrees to the plan.  No follow-ups on file.    This note was prepared using [employer] voice recognition dictation software. As a result errors may occur. When identified these errors have been corrected. While every attempt is made to correct errors during dictation discrepancies may still exist.

## 2024-05-20 ENCOUNTER — APPOINTMENT (OUTPATIENT)
Dept: LAB | Facility: HOSPITAL | Age: 64
End: 2024-05-20
Attending: INTERNAL MEDICINE

## 2024-05-20 PROCEDURE — 82274 ASSAY TEST FOR BLOOD FECAL: CPT

## 2024-05-21 ENCOUNTER — LAB ENCOUNTER (OUTPATIENT)
Dept: LAB | Facility: HOSPITAL | Age: 64
End: 2024-05-21
Attending: INTERNAL MEDICINE

## 2024-05-21 DIAGNOSIS — Z12.11 SCREEN FOR COLON CANCER: ICD-10-CM

## 2024-05-21 LAB — HEMOCCULT STL QL: NEGATIVE

## 2024-06-06 ENCOUNTER — TELEPHONE (OUTPATIENT)
Dept: INTERNAL MEDICINE CLINIC | Facility: CLINIC | Age: 64
End: 2024-06-06

## 2024-06-06 NOTE — TELEPHONE ENCOUNTER
Called pt, no answer, LMTCB to let us know if he has gotten his diabetic eye exam done and I left a callback number

## 2024-09-18 DIAGNOSIS — Z00.00 ADULT GENERAL MEDICAL EXAM: ICD-10-CM

## 2024-09-18 RX ORDER — SEMAGLUTIDE 0.68 MG/ML
0.25 INJECTION, SOLUTION SUBCUTANEOUS WEEKLY
Qty: 9 ML | Refills: 0 | Status: SHIPPED | OUTPATIENT
Start: 2024-09-18

## 2024-09-18 NOTE — TELEPHONE ENCOUNTER
Current Outpatient Medications:     metFORMIN HCl 1000 MG Oral Tab, Take 1 tablet (1,000 mg total) by mouth 2 (two) times daily with meals., Disp: 180 tablet, Rfl: 3

## 2024-09-18 NOTE — TELEPHONE ENCOUNTER
Endocrine Refill protocol for oral and injectable diabetic medications    Protocol Criteria:  FAILED  Reason: No Visit in required time frame  -Appointment with Endocrinology completed in the last 6 months or scheduled in the next 3 months    -A1c result below 8.5% in the past 6 months      Verify the above has been completed or scheduled in the appropriate timeline. If so can send a 90 day supply with 1 refill.     Last completed office visit: 4/18/23    Next scheduled Follow up: No appointment scheduled - Called patient to help schedule a follow up appointment, no answer, left message to call back. [employer] message sent.      Last A1c result: Last A1c value was 7.1% done 5/3/2024.

## 2024-09-18 NOTE — TELEPHONE ENCOUNTER
Called and spoke to patient, appointment scheduled for first available in [facility] (patient preference).

## 2024-09-23 NOTE — TELEPHONE ENCOUNTER
REFILL PASSED PER [facility]    Requested Prescriptions   Pending Prescriptions Disp Refills    metFORMIN HCl 1000 MG Oral Tab 180 tablet 3     Sig: Take 1 tablet (1,000 mg total) by mouth 2 (two) times daily with meals.       Diabetes Medication Protocol Passed - 9/18/2024  2:05 PM        Passed - Last A1C < 7.5 and within past 6 months     Lab Results   Component Value Date    A1C 7.1 (A) 05/03/2024             Passed - In person appointment or virtual visit in the past 6 mos or appointment in next 3 mos     Recent Outpatient Visits              4 months ago Type 2 diabetes mellitus without retinopathy (HCC)    [facility] James Do MD    Office Visit    1 year ago Adult general medical exam    [facility] James Do MD    Office Visit    1 year ago Uncontrolled type 2 diabetes mellitus with hyperglycemia (HCC)    [facility] Alex Hankins APRN    Office Visit    2 years ago Type 2 diabetes mellitus without retinopathy (HCC)    [facility] James Do MD    Office Visit    3 years ago Person with feared complaint in whom no diagnosis was made    [facility]    Nurse Only          Future Appointments         Provider Department Appt Notes    In 1 week Anthony Hernandez MD [facility] Dx: Eczema, unspecified type (L30.9)   Signed Referral [facility]:    Number of Visits: 1    In 2 months Alex Hankins APRN [facility] DM2    In 2 months James Do MD [facility] 6 month follow up                    Passed - Microalbumin procedure in past 12 months or taking ACE/ARB        Passed - EGFRCR or GFRAA > 50     GFR Evaluation  EGFRCR: 92  , resulted on 5/3/2024          Passed - GFR in the past 12 months             Future Appointments         Provider Department Appt Notes    In 1 week Anthony Hernandez MD [facility] Dx: Eczema, unspecified type (L30.9)   Signed Referral [facility]:    Number of Visits: 1    In 2 months Alex Hankins APRN [facility] DM2    In 2 months James Do MD [facility] 6 month follow up          Recent Outpatient Visits              4 months ago Type 2 diabetes mellitus without retinopathy (HCC)    [facility] James Do MD    Office Visit    1 year ago [facility] general medical exam    [facility]    Office Visit    1 year ago Uncontrolled type 2 diabetes mellitus with hyperglycemia (HCC)    [facility] Alex Hankins APRN    Office Visit    2 years ago Type 2 diabetes mellitus without retinopathy (HCC)    [facility]    [facility] Visit    3 years ago Person with feared complaint in whom no diagnosis was made    [facility]    Nurse Only

## 2024-10-01 ENCOUNTER — OFFICE VISIT (OUTPATIENT)
Dept: DERMATOLOGY CLINIC | Facility: CLINIC | Age: 64
End: 2024-10-01

## 2024-10-01 DIAGNOSIS — L30.9 ECZEMA, UNSPECIFIED TYPE: Primary | ICD-10-CM

## 2024-10-01 PROCEDURE — 99204 OFFICE O/P NEW MOD 45 MIN: CPT | Performed by: STUDENT IN AN ORGANIZED HEALTH CARE EDUCATION/TRAINING PROGRAM

## 2024-10-01 RX ORDER — TACROLIMUS 1 MG/G
OINTMENT TOPICAL
Qty: 60 G | Refills: 11 | Status: SHIPPED | OUTPATIENT
Start: 2024-10-01

## 2024-10-01 RX ORDER — TRIAMCINOLONE ACETONIDE 1 MG/G
1 CREAM TOPICAL 2 TIMES DAILY
Qty: 80 G | Refills: 0 | Status: SHIPPED | OUTPATIENT
Start: 2024-10-01

## 2024-10-01 RX ORDER — FLUOCINONIDE TOPICAL SOLUTION USP, 0.05% 0.5 MG/ML
SOLUTION TOPICAL
Qty: 60 ML | Refills: 5 | Status: SHIPPED | OUTPATIENT
Start: 2024-10-01

## 2024-10-01 NOTE — PROGRESS NOTES
October 1, 2024    New patient     Referred by:   Dr. Do    CHIEF COMPLAINT: Eczema    HISTORY OF PRESENT ILLNESS: .    1. Eczema   Location: scalp, hands and legs    Duration: years   Signs and symptoms: still occasionally itchy and dry   Current treatment: hydrocortisone 1% over the counter; helps a bit.     DERM HISTORY:  History of skin cancer: No  History of chronic skin disease/condition: Yes    FAMILY HISTORY:  History of melanoma: No  History of chronic skin disease/condition: No    History/Other:    REVIEW OF SYSTEMS:  Constitutional: Denies fever, chills, unintentional weight loss.   Skin as per HPI    PAST MEDICAL HISTORY:  Past Medical History:    Pre-diabetes       Medications  Current Outpatient Medications   Medication Sig Dispense Refill    metFORMIN HCl 1000 MG Oral Tab Take 1 tablet (1,000 mg total) by mouth 2 (two) times daily with meals. 180 tablet 3    OZEMPIC, 0.25 OR 0.5 MG/DOSE, 2 MG/3ML Subcutaneous Solution Pen-injector INJECT 0.25 MG UNDER THE SKIN ONCE A WEEK 9 mL 0    atorvastatin 10 MG Oral Tab Take 1 tablet (10 mg total) by mouth daily. 90 tablet 3    amLODIPine 10 MG Oral Tab Take 1 tablet (10 mg total) by mouth daily. 90 tablet 3    Sildenafil Citrate 100 MG Oral Tab Take 1 tablet (100 mg total) by mouth as needed for Erectile Dysfunction. 24 tablet 3    ONETOUCH ULTRA In Vitro Strip 1 each by Other route daily. 100 each 1    Omega-3-acid Ethyl Esters 1 g Oral Cap Take 1 capsule (1 g total) by mouth daily. 90 capsule 3    Blood Glucose Monitoring Suppl (ONETOUCH ULTRA 2) w/Device Does not apply Kit Use as directed 1 kit 0    Lancets (ONETOUCH DELICA PLUS [id number]) Does not apply Misc 1 strip by In Vitro route daily.      Multiple Vitamins-Minerals (CENTRUM SILVER) Oral Tab Take 1 tablet by mouth daily.         Objective:    PHYSICAL EXAM:  General: awake, alert, no acute distress  Skin: Skin exam was performed today including the following: face, scalp , trunk and extremities.  Pertinent findings include:   - with pink scaly and lichenified plaques    ASSESSMENT & PLAN:  Pathophysiology of diagnoses discussed with patient.  Therapeutic options reviewed. Risks, benefits, and alternatives discussed with patient. Instructions reviewed at length.    #Atopic dermatitis  - -Fluocinonide 0.05% twice daily to affected areas Monday-Friday.Take weekends off. Avoid use on face, breasts, groin, or axiillae. Counseled patient regarding side effects of topical steroids including, but are not limited to: atrophy, striae, telangectasias, tachyphylaxis, pigmentary changes, as well as risk of cataracts and glaucoma with periocular use.  Informed patient that topical steroids should only be used on active skin lesions and not on normal skin.   - Triamcinolone 0.1% twice daily to affected areas Monday-Friday. Take weekends off. Avoid use on face, breasts, groin, or axiillae.   - Tacrolimus twice daily to affected areas on face. This is required as a maintenance, non-steroidal medication to prevent long-term side effects of topical steroids, including permanent atrophy and glaucoma (eyelids)  - In reserve: dupixent which was discussed today    Return to clinic: 2 months or sooner if something concerning arises     Anthony Hernandez MD

## 2024-12-03 NOTE — TELEPHONE ENCOUNTER
Sildenafil Citrate 100 MG Oral Tab, Take 1 tablet (100 mg total) by mouth as needed for Erectile Dysfunction., Disp: 24 tablet, Rfl: 3

## 2024-12-05 RX ORDER — SILDENAFIL 100 MG/1
100 TABLET, FILM COATED ORAL AS NEEDED
Qty: 24 TABLET | Refills: 3 | Status: SHIPPED | OUTPATIENT
Start: 2024-12-05

## 2024-12-06 NOTE — TELEPHONE ENCOUNTER
Refill passed per [facility] protocol.     Requested Prescriptions   Pending Prescriptions Disp Refills    Sildenafil Citrate 100 MG Oral Tab 24 tablet 3     Sig: Take 1 tablet (100 mg total) by mouth as needed for Erectile Dysfunction.       Genitourinary Medications Passed - 12/5/2024  6:38 PM        Passed - Patient does not have pulmonary hypertension on problem list        Passed - In person appointment or virtual visit in the past 12 mos or appointment in next 3 mos     Recent Outpatient Visits              2 months ago Eczema, unspecified type    [facility] Anthony Hernandez MD    Office Visit    7 months ago Type 2 diabetes mellitus without retinopathy (HCC)    [facility] James Do MD    Office Visit    1 year ago Adult general medical exam    [facility]    Office Visit    1 year ago Uncontrolled type 2 diabetes mellitus with hyperglycemia (HCC)    [facility] Alex Hankins APRN    Office Visit    2 years ago Type 2 diabetes mellitus without retinopathy (HCC)    [facility]    Office Visit          Future Appointments         Provider Department Appt Notes    In 2 days James Do MD [facility] 6 month follow up    In 1 month Alex Hankins APRN [facility] DM2

## 2024-12-07 ENCOUNTER — OFFICE VISIT (OUTPATIENT)
Facility: CLINIC | Age: 64
End: 2024-12-07

## 2024-12-07 VITALS
SYSTOLIC BLOOD PRESSURE: 138 MMHG | BODY MASS INDEX: 31.42 KG/M2 | WEIGHT: 232 LBS | HEART RATE: 85 BPM | HEIGHT: 72 IN | OXYGEN SATURATION: 95 % | DIASTOLIC BLOOD PRESSURE: 86 MMHG

## 2024-12-07 DIAGNOSIS — E11.9 TYPE 2 DIABETES MELLITUS WITHOUT RETINOPATHY (HCC): Primary | ICD-10-CM

## 2024-12-07 DIAGNOSIS — E78.5 DYSLIPIDEMIA: ICD-10-CM

## 2024-12-07 DIAGNOSIS — I10 ESSENTIAL HYPERTENSION: ICD-10-CM

## 2024-12-07 DIAGNOSIS — Z00.00 ADULT GENERAL MEDICAL EXAM: ICD-10-CM

## 2024-12-07 PROCEDURE — 3075F SYST BP GE 130 - 139MM HG: CPT | Performed by: INTERNAL MEDICINE

## 2024-12-07 PROCEDURE — 3008F BODY MASS INDEX DOCD: CPT | Performed by: INTERNAL MEDICINE

## 2024-12-07 PROCEDURE — 99214 OFFICE O/P EST MOD 30 MIN: CPT | Performed by: INTERNAL MEDICINE

## 2024-12-07 PROCEDURE — 3079F DIAST BP 80-89 MM HG: CPT | Performed by: INTERNAL MEDICINE

## 2024-12-07 PROCEDURE — 3060F POS MICROALBUMINURIA REV: CPT | Performed by: INTERNAL MEDICINE

## 2024-12-07 RX ORDER — ATORVASTATIN CALCIUM 10 MG/1
10 TABLET, FILM COATED ORAL DAILY
Qty: 90 TABLET | Refills: 3 | Status: SHIPPED | OUTPATIENT
Start: 2024-12-07

## 2024-12-07 NOTE — PROGRESS NOTES
Shi Melendez is a 64 year old male.  Chief Complaint   Patient presents with    Follow - Up     6 month follow up, would like to get chest X ray due to pain in chest on left side     HPI:   64-year-old gentleman here for follow-up.  He reports that he is doing well.  Sugars are getting better.  He denies any hypoglycemic events.  He did not take his blood pressure medicine today morning.  He was not taking statins as well.  Denies any chest pain or shortness of breath.  He gets pain on the left chest when he carries his laptop.  Denies any exertional pain.  Denies any cough or hemoptysis.      Current Outpatient Medications   Medication Sig Dispense Refill    atorvastatin 10 MG Oral Tab Take 1 tablet (10 mg total) by mouth daily. 90 tablet 3    Sildenafil Citrate 100 MG Oral Tab Take 1 tablet (100 mg total) by mouth as needed for Erectile Dysfunction. 24 tablet 3    Fluocinonide 0.05 % External Solution Use twice daily Monday-Friday with flares on scalp. Do not use on face. 60 mL 5    triamcinolone 0.1 % External Cream Apply 1 Application topically 2 (two) times daily. Use on trunk and extremities 80 g 0    tacrolimus 0.1 % External Ointment Use twice daily to affected areas of face 60 g 11    metFORMIN HCl 1000 MG Oral Tab Take 1 tablet (1,000 mg total) by mouth 2 (two) times daily with meals. 180 tablet 3    OZEMPIC, 0.25 OR 0.5 MG/DOSE, 2 MG/3ML Subcutaneous Solution Pen-injector INJECT 0.25 MG UNDER THE SKIN ONCE A WEEK 9 mL 0    amLODIPine 10 MG Oral Tab Take 1 tablet (10 mg total) by mouth daily. 90 tablet 3    ONETOUCH ULTRA In Vitro Strip 1 each by Other route daily. 100 each 1    Omega-3-acid Ethyl Esters 1 g Oral Cap Take 1 capsule (1 g total) by mouth daily. 90 capsule 3    Blood Glucose Monitoring Suppl (ONETOUCH ULTRA 2) w/Device Does not apply Kit Use as directed 1 kit 0    Lancets (ONETOUCH DELICA PLUS [id number]) Does not apply Misc 1 strip by In Vitro route daily.      Multiple Vitamins-Minerals  (CENTRUM SILVER) Oral Tab Take 1 tablet by mouth daily.        Past Medical History:    Pre-diabetes      Past Surgical History:   Procedure Laterality Date    Appendectomy      Us biopsy renal right (cpt=76942/19838)        Social History:  Social History     Socioeconomic History    Marital status:    Tobacco Use    Smoking status: Never    Smokeless tobacco: Never   Vaping Use    Vaping status: Never Used   Substance and Sexual Activity    Alcohol use: Never    Drug use: Never   Other Topics Concern    Outdoor occupation No    Reaction to local anesthetic No    Pt has a pacemaker No    Pt has a defibrillator No      Family History   Problem Relation Age of Onset    Diabetes Neg     Glaucoma Neg     Macular degeneration Neg       Allergies[1]     REVIEW OF SYSTEMS:   Review of Systems   Review of Systems   Constitutional: Negative for activity change, appetite change and fever.   HENT: Negative for congestion and voice change.    Respiratory: Negative for cough and shortness of breath.    Cardiovascular: Negative for chest pain.   Gastrointestinal: Negative for abdominal distention, abdominal pain and vomiting.   Genitourinary: Negative for hematuria.   Skin: Negative for wound.   Psychiatric/Behavioral: Negative for behavioral problems.   Wt Readings from Last 5 Encounters:   12/07/24 232 lb (105.2 kg)   05/03/24 226 lb (102.5 kg)   06/22/23 228 lb (103.4 kg)   04/18/23 227 lb (103 kg)   04/21/22 233 lb (105.7 kg)     Body mass index is 31.46 kg/m².      EXAM:   /86   Pulse 85   Ht 6' (1.829 m)   Wt 232 lb (105.2 kg)   SpO2 95%   BMI 31.46 kg/m²   Physical Exam   Constitutional:       Appearance: Normal appearance.   HENT:      Head: Normocephalic.   Eyes:      Conjunctiva/sclera: Conjunctivae normal.   Cardiovascular:      Rate and Rhythm: Normal rate and regular rhythm.      Heart sounds: Normal heart sounds. No murmur heard.  Pulmonary:      Effort: Pulmonary effort is normal.      Breath  sounds: Normal breath sounds. No rhonchi or rales.   Abdominal:      General: Bowel sounds are normal.      Palpations: Abdomen is soft.      Tenderness: There is no abdominal tenderness.   Musculoskeletal:      Cervical back: Neck supple.      Right lower leg: No edema.      Left lower leg: No edema.   Skin:     General: Skin is warm and dry.   Neurological:      General: No focal deficit present.      Mental Status: He is alert and oriented to person, place, and time. Mental status is at baseline.   Psychiatric:         Mood and Affect: Mood normal.         Behavior: Behavior normal.       ASSESSMENT AND PLAN:   1. Type 2 diabetes mellitus without retinopathy (HCC)  We will continue to monitor hemoglobin A1c.  Discussed regarding importance of taking statins.  He will complete eye exam today.    2. Essential hypertension  Blood pressure is running borderline high.  I he did not take his medicines today.  He likes salt.  Discussed low-salt diet.  Discussed regarding compliance with medications.  He will continue to monitor blood pressure at home and if it is high he will contact me.    3. Dyslipidemia  *He was not taking statins.  I have discussed with him regarding importance of taking statins.  He will resume it.  He will repeat lipid profile and LFT in 6 months    4. Adult general medical exam  Labs ordered as a part of his physical.  Discussed regarding elevated PSA.  If it is still going up, he will see urology.  I have discussed with him in the past as well.  - atorvastatin 10 MG Oral Tab; Take 1 tablet (10 mg total) by mouth daily.  Dispense: 90 tablet; Refill: 3  - CBC, Platelet; No Differential; Future  - Comp Metabolic Panel (14); Future  - Hemoglobin A1C; Future  - Lipid Panel; Future  - TSH W Reflex To Free T4; Future  - PSA Total, Screen; Future  - Microalb/Creat Ratio, Random Urine; Future    Plan: As above.      The patient indicates understanding of these issues and agrees to the plan.  No  follow-ups on file.    This note was prepared using Dragon Medical voice recognition dictation software. As a result errors may occur. When identified these errors have been corrected. While every attempt is made to correct errors during dictation discrepancies may still exist.         [1] No Known Allergies

## 2025-01-23 ENCOUNTER — OFFICE VISIT (OUTPATIENT)
Dept: ENDOCRINOLOGY CLINIC | Facility: CLINIC | Age: 65
End: 2025-01-23

## 2025-01-23 VITALS
HEIGHT: 72 IN | BODY MASS INDEX: 31.42 KG/M2 | SYSTOLIC BLOOD PRESSURE: 128 MMHG | WEIGHT: 232 LBS | DIASTOLIC BLOOD PRESSURE: 64 MMHG | HEART RATE: 79 BPM

## 2025-01-23 DIAGNOSIS — E11.65 UNCONTROLLED TYPE 2 DIABETES MELLITUS WITH HYPERGLYCEMIA (HCC): Primary | ICD-10-CM

## 2025-01-23 DIAGNOSIS — E78.5 DYSLIPIDEMIA: ICD-10-CM

## 2025-01-23 LAB
GLUCOSE BLOOD: 141
HEMOGLOBIN A1C: 8.5 % (ref 4.3–5.6)
TEST STRIP LOT #: NORMAL NUMERIC

## 2025-01-23 PROCEDURE — 3078F DIAST BP <80 MM HG: CPT | Performed by: NURSE PRACTITIONER

## 2025-01-23 PROCEDURE — 83036 HEMOGLOBIN GLYCOSYLATED A1C: CPT | Performed by: NURSE PRACTITIONER

## 2025-01-23 PROCEDURE — 99214 OFFICE O/P EST MOD 30 MIN: CPT | Performed by: NURSE PRACTITIONER

## 2025-01-23 PROCEDURE — 3052F HG A1C>EQUAL 8.0%<EQUAL 9.0%: CPT | Performed by: NURSE PRACTITIONER

## 2025-01-23 PROCEDURE — 3008F BODY MASS INDEX DOCD: CPT | Performed by: NURSE PRACTITIONER

## 2025-01-23 PROCEDURE — 82947 ASSAY GLUCOSE BLOOD QUANT: CPT | Performed by: NURSE PRACTITIONER

## 2025-01-23 PROCEDURE — 3074F SYST BP LT 130 MM HG: CPT | Performed by: NURSE PRACTITIONER

## 2025-01-23 RX ORDER — ATORVASTATIN CALCIUM 40 MG/1
40 TABLET, FILM COATED ORAL NIGHTLY
Qty: 90 TABLET | Refills: 0 | Status: SHIPPED | OUTPATIENT
Start: 2025-01-23

## 2025-01-23 RX ORDER — SEMAGLUTIDE 0.68 MG/ML
0.5 INJECTION, SOLUTION SUBCUTANEOUS WEEKLY
Qty: 9 ML | Refills: 0 | Status: SHIPPED | OUTPATIENT
Start: 2025-01-23

## 2025-01-23 NOTE — PROGRESS NOTES
Name: Marcello Melendez  Date: 2025    Referring Physician: James Do    CHIEF COMPLAINT   Chief Complaint   Patient presents with    Diabetes     Follow up and glucose check      HISTORY OF PRESENT ILLNESS   Marcello Melendez is a 64 year old male who presents for follow up on diabetes management.   HbA1C:  8.5% at POC today. Previously was 7.1% on 5/3/2024  Blood glucose is: 141 in clinic today.   Patient notes that he has been feeling well and denies any health changes since last office visit. He continues to follow a low carb diet and staying active per usual. He has been compliant with taking all diabetes medications.     FAMILY HISTORY OF DIABETES  -none   DIABETES HISTORY  Diagnosed: in   Prior HbA, C or glycohemoglobin were 11.5% 2023; 10.9% ; 7.1% 5/3/2024; 8.5% at POC today;     Patient has not had hospitalizations for blood sugar issues.   Denies any history of pancreatitis    PREVIOUS MEDICATION FOR DM:  Glipizide- d/c'ed on his own     CURRENT MEDICATIONS FOR DM:  -Metformin 1,000mg twice daily   - Ozempic 0.25mg once weekly - tolerating well; denies any GI s/e     HOME GLUCOSE READINGS:   Testing BG x1 daily   Meter or log book today: no   Fastins-130s   Does not check any other time of the day   Hypoglycemia present: no   Hypoglycemia awareness: n/a     HISTORY OF DIABETES COMPLICATIONS:  History of Retinopathy: denies - last eye exam within the last 12 months: no   History of Neuropathy: yes -- mild and tolerable    History of Nephropathy: no     ASSOCIATED COMPLICATIONS:   HTN: yes   Hyperlipidemia: yes   Cardiovascular Disease: no   Peripheral Vascular Disease: no     DIETARY COMPLIANCE:  Fair; follows a low carb diet inconsistently   +  protein   + rice   + veggies   - occasionally dark chocolate   - diet soda only   - denies drinking fruit juice   + water     EXERCISE:   No - walking frequently >10,000 steps daily     Polyuria, polyphagia, polydipsia: no   Paresthesias: no    Blurred vision: no   Recent steroids, illness or infections: no     REVIEW OF SYSTEMS  Constitutional: Negative for: weight change, fever, fatigue, cold/heat intolerance  Eyes: Negative for:  Visual changes, proptosis, blurring  ENT: Negative for:  dysphagia, neck swelling, dysphonia  Respiratory: Negative for: hemoptysis, shortness of breath, cough, or dyspnea.  Cardiovascular: Negative for:  chest pain, chest discomfort, palpitations  GI: Negative for:  abdominal pain, nausea, vomiting, diarrhea, heartburn, constipation  Neurology: Negative for: headache, dizziness, syncope, numbness/tingling, or weakness.   Genito-Urinary: Negative for: dysuria, frequency or hematuria   Hematology/Lymphatics: Negative for: bruising, easy bleeding, lower extremity edema  Skin: Negative for: rash, blister, infection or ulcers.  Endocrine: Negative for: polyuria, polydipsia. No osteoporosis. No thyroid disease.     MEDICATIONS:     Current Outpatient Medications:     atorvastatin 10 MG Oral Tab, Take 1 tablet (10 mg total) by mouth daily., Disp: 90 tablet, Rfl: 3    Sildenafil Citrate 100 MG Oral Tab, Take 1 tablet (100 mg total) by mouth as needed for Erectile Dysfunction., Disp: 24 tablet, Rfl: 3    Fluocinonide 0.05 % External Solution, Use twice daily Monday-Friday with flares on scalp. Do not use on face., Disp: 60 mL, Rfl: 5    triamcinolone 0.1 % External Cream, Apply 1 Application topically 2 (two) times daily. Use on trunk and extremities, Disp: 80 g, Rfl: 0    tacrolimus 0.1 % External Ointment, Use twice daily to affected areas of face, Disp: 60 g, Rfl: 11    metFORMIN HCl 1000 MG Oral Tab, Take 1 tablet (1,000 mg total) by mouth 2 (two) times daily with meals., Disp: 180 tablet, Rfl: 3    OZEMPIC, 0.25 OR 0.5 MG/DOSE, 2 MG/3ML Subcutaneous Solution Pen-injector, INJECT 0.25 MG UNDER THE SKIN ONCE A WEEK, Disp: 9 mL, Rfl: 0    amLODIPine 10 MG Oral Tab, Take 1 tablet (10 mg total) by mouth daily., Disp: 90 tablet, Rfl:  3    ONETOUCH ULTRA In Vitro Strip, 1 each by Other route daily., Disp: 100 each, Rfl: 1    Omega-3-acid Ethyl Esters 1 g Oral Cap, Take 1 capsule (1 g total) by mouth daily., Disp: 90 capsule, Rfl: 3    Blood Glucose Monitoring Suppl (ONETOUCH ULTRA 2) w/Device Does not apply Kit, Use as directed, Disp: 1 kit, Rfl: 0    Lancets (ONETOUCH DELICA PLUS [id number]) Does not apply Misc, 1 strip by In Vitro route daily., Disp: , Rfl:     Multiple Vitamins-Minerals (CENTRUM SILVER) Oral Tab, Take 1 tablet by mouth daily., Disp: , Rfl:     ALLERGIES:   No Known Allergies    SOCIAL HISTORY:   Social History     Socioeconomic History    Marital status:    Tobacco Use    Smoking status: Never    Smokeless tobacco: Never   Vaping Use    Vaping status: Never Used   Substance and Sexual Activity    Alcohol use: Never    Drug use: Never   Other Topics Concern    Outdoor occupation No    Reaction to local anesthetic No    Pt has a pacemaker No    Pt has a defibrillator No       PAST MEDICAL HISTORY:   Past Medical History:    Pre-diabetes       PAST SURGICAL HISTORY:   Past Surgical History:   Procedure Laterality Date    Appendectomy      Us biopsy renal right (cpt=76942/85445)         PHYSICAL EXAM:   Vitals:    01/23/25 1249 01/23/25 1335   BP: 140/69 128/64   Pulse: 79    Weight: 232 lb (105.2 kg)    Height: 6' (1.829 m)      BMI:   Body mass index is 31.46 kg/m².    General Appearance:  alert, well developed, in no acute distress  Nutritional:  no extreme weight gain or loss  Head: Atraumatic  Eyes:  normal conjunctivae, sclera., normal sclera and normal pupils  Throat/Neck: normal sound to voice. Normal hearing, normal speech  Back: no kyphosis  Respiratory:  Speaking in full sentences, non-labored. no increased work of breathing, no audible wheezing    Skin:  normal moisture and skin texture, no visible lesions  Hair and nails: normal scalp hair  Hematologic:  no excessive bruising  Neuro: motor grossly intact, moving  all extremities without difficulty  Psychiatric:  oriented to time, self, and place  Extremities: no obvious extremity swelling, no lesions    Diabetic Foot Exam:  Bilateral barefoot skin diabetic exam is normal, visualized feet and the appearance is normal.  Bilateral monofilament/sensation of both feet is normal.  Pulsation pedal pulse exam of both lower legs/feet is normal as well.    LABS: Pertinent labs reviewed    ASSESSMENT/PLAN:    -Reviewed with patient the pathogenesis of diabetes, clinical significance of A1c, and common complications such as: microvascular, macrovascular and diabetic ketoacidosis. Patient verbalizes understanding of the importance of glycemic control and the goals of therapy.   -Discussed with patient glucose targets ranges (Fasting  and post prandial <180).     1.Type 2 Diabetes Mellitus, uncontrolled, with hyperglycemia   -LAB DATA  HbA,C: 8.5% today   a) Medications  - continue with Metformin 1,000mg twice daily   - increase Ozempic 0.25 --> 0.5mg once weekly - Risks and benefits reviewed. Verbalizes understanding.       - discussed to cont. Limiting carbs to 30-45gm per meal   - discussed to increase weight resistance exercises to at least 3x weekly   - cont. Walking as currently doing   -reviewed target goal BG readings and A1C  - reviewed when to contact me to notify me of abnormal glucose patterns     b) Nephropathy: GFR: 92 and urine MA: 214.3 on 5/3/2024  c) Discussed importance of annual eye exams  D) foot exam: normal today 1/28/2025  e) cont. Testing bg 1x daily - alternate testing times with fasting or 2hrs after meals   f) Life style changes reviewed.     2. Hypertension   -on amlodipine 10mg daily   - normotensive today          RTC in 3 months    Patient instructed to call sooner if they develop Blood glucose readings <75 and/or if they have readings persistently >200.     The risks and benefits of my recommendations were discussed with the patient today. questions  were also answered to the best of my knowledge. Patient verbalizes understanding of these issues and agrees to the plan.    1/23/2025  VEE Lieberman

## 2025-01-23 NOTE — PATIENT INSTRUCTIONS
A1C: 8.5% today --> decrease from 7.1% on 5/3/2024  Blood glucose: 141 in clinic today    Medications:   - Continue with Metformin 1,000mg twice daily   - increase Ozempic 0.25 --> 0.5mg once weekly   - increase Atorvastatin to 40mg once nightly    --> repeat lipid panel in 3-4 months (any day after 4/23)    - let's get on track with following a low carb diet consistently   - continue to stay active with walking as currently   - increase weight resistance at least 3x weekly       Weight:  Wt Readings from Last 6 Encounters:   01/23/25 232 lb (105.2 kg)   12/07/24 232 lb (105.2 kg)   05/03/24 226 lb (102.5 kg)   06/22/23 228 lb (103.4 kg)   04/18/23 227 lb (103 kg)   04/21/22 233 lb (105.7 kg)     A1C goal:  <7.0%    Blood sugar testing:  Test your blood sugar 1 time daily   Recommended times to test: alternate with before breakfast (fasting) or 2hrs after meals     Blood sugar targets:  Before breakfast:  (preferably < 110)  2 hours after meals: <180 (preferably <150)     Call for persistent blood sugars < 75 or > 200

## 2025-01-24 ENCOUNTER — MED REC SCAN ONLY (OUTPATIENT)
Dept: INTERNAL MEDICINE CLINIC | Facility: CLINIC | Age: 65
End: 2025-01-24

## 2025-05-03 ENCOUNTER — LAB ENCOUNTER (OUTPATIENT)
Dept: LAB | Age: 65
End: 2025-05-03
Attending: INTERNAL MEDICINE
Payer: COMMERCIAL

## 2025-05-03 ENCOUNTER — OFFICE VISIT (OUTPATIENT)
Dept: INTERNAL MEDICINE CLINIC | Facility: CLINIC | Age: 65
End: 2025-05-03

## 2025-05-03 VITALS
HEART RATE: 78 BPM | TEMPERATURE: 98 F | OXYGEN SATURATION: 95 % | HEIGHT: 72 IN | WEIGHT: 230 LBS | DIASTOLIC BLOOD PRESSURE: 80 MMHG | SYSTOLIC BLOOD PRESSURE: 136 MMHG | BODY MASS INDEX: 31.15 KG/M2

## 2025-05-03 DIAGNOSIS — Z00.00 ADULT GENERAL MEDICAL EXAM: ICD-10-CM

## 2025-05-03 DIAGNOSIS — E11.9 TYPE 2 DIABETES MELLITUS WITHOUT RETINOPATHY (HCC): ICD-10-CM

## 2025-05-03 DIAGNOSIS — Z12.11 SCREEN FOR COLON CANCER: ICD-10-CM

## 2025-05-03 DIAGNOSIS — Z23 NEED FOR PNEUMOCOCCAL VACCINATION: ICD-10-CM

## 2025-05-03 DIAGNOSIS — N52.9 ERECTILE DYSFUNCTION, UNSPECIFIED ERECTILE DYSFUNCTION TYPE: ICD-10-CM

## 2025-05-03 DIAGNOSIS — Z00.00 ADULT GENERAL MEDICAL EXAM: Primary | ICD-10-CM

## 2025-05-03 LAB
ALBUMIN SERPL-MCNC: 4.7 G/DL (ref 3.2–4.8)
ALBUMIN/GLOB SERPL: 1.6 {RATIO} (ref 1–2)
ALP LIVER SERPL-CCNC: 80 U/L (ref 45–117)
ALT SERPL-CCNC: 24 U/L (ref 10–49)
ANION GAP SERPL CALC-SCNC: 11 MMOL/L (ref 0–18)
AST SERPL-CCNC: 21 U/L (ref ?–34)
BILIRUB SERPL-MCNC: 0.4 MG/DL (ref 0.2–1.1)
BUN BLD-MCNC: 12 MG/DL (ref 9–23)
BUN/CREAT SERPL: 11.7 (ref 10–20)
CALCIUM BLD-MCNC: 9.1 MG/DL (ref 8.7–10.4)
CHLORIDE SERPL-SCNC: 102 MMOL/L (ref 98–112)
CHOLEST SERPL-MCNC: 118 MG/DL (ref ?–200)
CO2 SERPL-SCNC: 26 MMOL/L (ref 21–32)
COMPLEXED PSA SERPL-MCNC: 3.33 NG/ML (ref ?–4)
CREAT BLD-MCNC: 1.03 MG/DL (ref 0.7–1.3)
CREAT UR-SCNC: 137.9 MG/DL
DEPRECATED RDW RBC AUTO: 40.7 FL (ref 35.1–46.3)
EGFRCR SERPLBLD CKD-EPI 2021: 82 ML/MIN/1.73M2 (ref 60–?)
ERYTHROCYTE [DISTWIDTH] IN BLOOD BY AUTOMATED COUNT: 11.9 % (ref 11–15)
EST. AVERAGE GLUCOSE BLD GHB EST-MCNC: 192 MG/DL (ref 68–126)
FASTING PATIENT LIPID ANSWER: YES
FASTING STATUS PATIENT QL REPORTED: YES
GLOBULIN PLAS-MCNC: 3 G/DL (ref 2–3.5)
GLUCOSE BLD-MCNC: 136 MG/DL (ref 70–99)
HBA1C MFR BLD: 8.3 % (ref ?–5.7)
HCT VFR BLD AUTO: 41.9 % (ref 39–53)
HDLC SERPL-MCNC: 39 MG/DL (ref 40–59)
HGB BLD-MCNC: 13.8 G/DL (ref 13–17.5)
LDLC SERPL CALC-MCNC: 63 MG/DL (ref ?–100)
MCH RBC QN AUTO: 30.5 PG (ref 26–34)
MCHC RBC AUTO-ENTMCNC: 32.9 G/DL (ref 31–37)
MCV RBC AUTO: 92.5 FL (ref 80–100)
MICROALBUMIN UR-MCNC: 21.2 MG/DL
MICROALBUMIN/CREAT 24H UR-RTO: 153.7 UG/MG (ref ?–30)
NONHDLC SERPL-MCNC: 79 MG/DL (ref ?–130)
OSMOLALITY SERPL CALC.SUM OF ELEC: 290 MOSM/KG (ref 275–295)
PLATELET # BLD AUTO: 270 10(3)UL (ref 150–450)
POTASSIUM SERPL-SCNC: 4.1 MMOL/L (ref 3.5–5.1)
PROT SERPL-MCNC: 7.7 G/DL (ref 5.7–8.2)
RBC # BLD AUTO: 4.53 X10(6)UL (ref 4.3–5.7)
SODIUM SERPL-SCNC: 139 MMOL/L (ref 136–145)
TRIGL SERPL-MCNC: 81 MG/DL (ref 30–149)
TSI SER-ACNC: 1.33 UIU/ML (ref 0.55–4.78)
VLDLC SERPL CALC-MCNC: 12 MG/DL (ref 0–30)
WBC # BLD AUTO: 7.5 X10(3) UL (ref 4–11)

## 2025-05-03 PROCEDURE — 80053 COMPREHEN METABOLIC PANEL: CPT

## 2025-05-03 PROCEDURE — 3008F BODY MASS INDEX DOCD: CPT | Performed by: INTERNAL MEDICINE

## 2025-05-03 PROCEDURE — 90677 PCV20 VACCINE IM: CPT | Performed by: INTERNAL MEDICINE

## 2025-05-03 PROCEDURE — 83036 HEMOGLOBIN GLYCOSYLATED A1C: CPT

## 2025-05-03 PROCEDURE — 82570 ASSAY OF URINE CREATININE: CPT

## 2025-05-03 PROCEDURE — 82043 UR ALBUMIN QUANTITATIVE: CPT

## 2025-05-03 PROCEDURE — 84443 ASSAY THYROID STIM HORMONE: CPT

## 2025-05-03 PROCEDURE — 90471 IMMUNIZATION ADMIN: CPT | Performed by: INTERNAL MEDICINE

## 2025-05-03 PROCEDURE — 3075F SYST BP GE 130 - 139MM HG: CPT | Performed by: INTERNAL MEDICINE

## 2025-05-03 PROCEDURE — 85027 COMPLETE CBC AUTOMATED: CPT

## 2025-05-03 PROCEDURE — 36415 COLL VENOUS BLD VENIPUNCTURE: CPT

## 2025-05-03 PROCEDURE — 3079F DIAST BP 80-89 MM HG: CPT | Performed by: INTERNAL MEDICINE

## 2025-05-03 PROCEDURE — 80061 LIPID PANEL: CPT

## 2025-05-03 PROCEDURE — 99396 PREV VISIT EST AGE 40-64: CPT | Performed by: INTERNAL MEDICINE

## 2025-05-03 RX ORDER — TADALAFIL 20 MG/1
20 TABLET ORAL AS NEEDED
Qty: 24 TABLET | Refills: 3 | Status: SHIPPED | OUTPATIENT
Start: 2025-05-03

## 2025-05-03 NOTE — PROGRESS NOTES
Marcello Melendez is a 65 year old male.  Chief Complaint   Patient presents with    Physical    Medication Follow-Up     Would like to discuss changing sildenafil citrate medication    Hip Pain     Pain in left hip    Urinary Frequency     HPI:   65-year-old gentleman here for physical he reports that he is doing well.  We had a lengthy discussion regarding importance of diabetes control.  He is also requesting for his sildenafil to be changed to Cialis.  Denies any chest pain or shortness of breath.  Denies any abdominal pain nausea vomiting.    Past medical history-diabetes, hypertension, dyslipidemia     Past surgical history appendectomy.     Family history-he denies any prostate cancer or colon cancer in the family.  He denies any premature heart disease or venous thromboembolic disease.     He does not smoke, denies alcohol or recreational drug abuse.     He is not allergic to the medication    Current Medications[1]   Past Medical History[2]   Past Surgical History[3]   Social History:  Short Social Hx on File[4]   Family History[5]   Allergies[6]     REVIEW OF SYSTEMS:   Review of Systems   Review of Systems   Constitutional: Negative for activity change, appetite change and fever.   HENT: Negative for congestion and voice change.    Respiratory: Negative for cough and shortness of breath.    Cardiovascular: Negative for chest pain.   Gastrointestinal: Negative for abdominal distention, abdominal pain and vomiting.   Genitourinary: Negative for hematuria.   Skin: Negative for wound.   Psychiatric/Behavioral: Negative for behavioral problems.   Wt Readings from Last 5 Encounters:   05/03/25 230 lb (104.3 kg)   01/23/25 232 lb (105.2 kg)   12/07/24 232 lb (105.2 kg)   05/03/24 226 lb (102.5 kg)   06/22/23 228 lb (103.4 kg)     Body mass index is 31.19 kg/m².      EXAM:   /80   Pulse 78   Temp 97.7 °F (36.5 °C) (Temporal)   Ht 6' (1.829 m)   Wt 230 lb (104.3 kg)   SpO2 95%   BMI 31.19 kg/m²   Physical  Exam   Constitutional:       Appearance: Normal appearance.   HENT:      Head: Normocephalic.   Eyes:      Conjunctiva/sclera: Conjunctivae normal.   Cardiovascular:      Rate and Rhythm: Normal rate and regular rhythm.      Heart sounds: Normal heart sounds. No murmur heard.  Pulmonary:      Effort: Pulmonary effort is normal.      Breath sounds: Normal breath sounds. No rhonchi or rales.   Abdominal:      General: Bowel sounds are normal.      Palpations: Abdomen is soft.      Tenderness: There is no abdominal tenderness.   Musculoskeletal:      Cervical back: Neck supple.      Right lower leg: No edema.      Left lower leg: No edema.   Skin:     General: Skin is warm and dry.   Neurological:      General: No focal deficit present.      Mental Status: He is alert and oriented to person, place, and time. Mental status is at baseline.   Psychiatric:         Mood and Affect: Mood normal.         Behavior: Behavior normal.       ASSESSMENT AND PLAN:   1. Adult general medical exam  Eat healthy, fruits and vegetables, exercise regularly, discussed regarding colonoscopy again.  He will think about it.  Stool card given.  PSA ordered.  Reviewed vaccines.  PCV 20 given today.  - CBC, Platelet; No Differential; Future  - Comp Metabolic Panel (14); Future  - Lipid Panel; Future  - TSH W Reflex To Free T4; Future  - PSA Total, Screen; Future  - Microalb/Creat Ratio, Random Urine; Future  - Hemoglobin A1C; Future    2. Screen for colon cancer    - Occult Blood, Fecal, FIT Immunoassay; Future  - GASTRO - INTERNAL    3. Type 2 diabetes mellitus without retinopathy (HCC)  Endocrinology referral given.  Discussed again regarding the importance of glycemic control, endorgan damage.  - Endocrine Referral - In Network    4. Need for pneumococcal vaccination    - Prevnar 20 (PCV20) [41733]    5. Erectile dysfunction, unspecified erectile dysfunction type  Viagra changed to Cialis.  Side effects discussed again  - Tadalafil (CIALIS)  20 MG Oral Tab; Take 1 tablet (20 mg total) by mouth as needed for Erectile Dysfunction.  Dispense: 24 tablet; Refill: 3    Plan: As above.  I will see him back in 6 months      The patient indicates understanding of these issues and agrees to the plan.  No follow-ups on file.    This note was prepared using Dragon Medical voice recognition dictation software. As a result errors may occur. When identified these errors have been corrected. While every attempt is made to correct errors during dictation discrepancies may still exist.         [1]   Current Outpatient Medications   Medication Sig Dispense Refill    Tadalafil (CIALIS) 20 MG Oral Tab Take 1 tablet (20 mg total) by mouth as needed for Erectile Dysfunction. 24 tablet 3    atorvastatin 40 MG Oral Tab Take 1 tablet (40 mg total) by mouth nightly. 90 tablet 0    semaglutide (OZEMPIC, 0.25 OR 0.5 MG/DOSE,) 2 MG/3ML Subcutaneous Solution Pen-injector Inject 0.5 mg into the skin once a week. 9 mL 0    metFORMIN HCl 1000 MG Oral Tab Take 1 tablet (1,000 mg total) by mouth 2 (two) times daily with meals. 180 tablet 3    amLODIPine 10 MG Oral Tab Take 1 tablet (10 mg total) by mouth daily. 90 tablet 3    ONETOUCH ULTRA In Vitro Strip 1 each by Other route daily. 100 each 1    Omega-3-acid Ethyl Esters 1 g Oral Cap Take 1 capsule (1 g total) by mouth daily. 90 capsule 3    Lancets (ONETOUCH DELICA PLUS [id number]) Does not apply Misc 1 strip by In Vitro route daily.      Multiple Vitamins-Minerals (CENTRUM SILVER) Oral Tab Take 1 tablet by mouth daily.      Fluocinonide 0.05 % External Solution Use twice daily Monday-Friday with flares on scalp. Do not use on face. (Patient not taking: Reported on 5/3/2025) 60 mL 5   [2]   Past Medical History:   Pre-diabetes   [3]   Past Surgical History:  Procedure Laterality Date    Appendectomy      Us biopsy renal right (cpt=76942/22025)     [4]   Social History  Socioeconomic History    Marital status:    Tobacco Use     Smoking status: Never    Smokeless tobacco: Never   Vaping Use    Vaping status: Never Used   Substance and Sexual Activity    Alcohol use: Never    Drug use: Never   Other Topics Concern    Outdoor occupation No    Reaction to local anesthetic No    Pt has a pacemaker No    Pt has a defibrillator No     Social Drivers of Health     Food Insecurity: No Food Insecurity (5/3/2025)    NCSS - Food Insecurity     Worried About Running Out of Food in the Last Year: No     Ran Out of Food in the Last Year: No   Transportation Needs: No Transportation Needs (5/3/2025)    NCSS - Transportation     Lack of Transportation: No   Housing Stability: Not At Risk (5/3/2025)    NCSS - Housing/Utilities     Has Housing: Yes     Worried About Losing Housing: No     Unable to Get Utilities: No   [5]   Family History  Problem Relation Age of Onset    Diabetes Neg     Glaucoma Neg     Macular degeneration Neg    [6] No Known Allergies

## 2025-05-06 DIAGNOSIS — Z00.00 ADULT GENERAL MEDICAL EXAM: ICD-10-CM

## 2025-05-06 NOTE — TELEPHONE ENCOUNTER
LOV: 1/23/2025  RTC: KELLY, no upcoming/MyChart sent  Last refill: 1/23/2025    VEE Johnson-please review and sign pended prescription if agreeable.

## 2025-05-07 RX ORDER — AMLODIPINE BESYLATE 10 MG/1
10 TABLET ORAL DAILY
Qty: 90 TABLET | Refills: 3 | Status: SHIPPED | OUTPATIENT
Start: 2025-05-07

## 2025-05-07 RX ORDER — ATORVASTATIN CALCIUM 40 MG/1
40 TABLET, FILM COATED ORAL NIGHTLY
Qty: 90 TABLET | Refills: 0 | Status: SHIPPED | OUTPATIENT
Start: 2025-05-07

## 2025-05-14 ENCOUNTER — TELEPHONE (OUTPATIENT)
Dept: ENDOCRINOLOGY CLINIC | Facility: CLINIC | Age: 65
End: 2025-05-14

## 2025-05-14 RX ORDER — BLOOD SUGAR DIAGNOSTIC
STRIP MISCELLANEOUS
Qty: 100 STRIP | Refills: 1 | Status: SHIPPED | OUTPATIENT
Start: 2025-05-14

## 2025-05-14 RX ORDER — BLOOD SUGAR DIAGNOSTIC
1 STRIP MISCELLANEOUS
COMMUNITY
End: 2025-05-14

## 2025-05-14 NOTE — TELEPHONE ENCOUNTER
Endocrine Refill protocol for Glucose testing supplies     Protocol Criteria: PASSED Reason: N/A    If below requirement is met, send a 90-day supply with 1 refill per provider protocol.    Verify appointment with Endocrinology completed in the last 6 months or scheduled in the next 3 months.    Last completed office visit:1/23/2025 Alex Hankins APRN   Last completed telemed visit: Visit date not found  Next scheduled Follow up:   Future Appointments   Date Time Provider Department Center   8/12/2025  5:30 PM Alex Hankins APRN [facility]      [facility]      [facility]

## 2025-05-23 ENCOUNTER — LAB ENCOUNTER (OUTPATIENT)
Dept: LAB | Facility: HOSPITAL | Age: 65
End: 2025-05-23
Attending: INTERNAL MEDICINE
Payer: COMMERCIAL

## 2025-05-23 DIAGNOSIS — Z12.11 SCREEN FOR COLON CANCER: ICD-10-CM

## 2025-05-23 LAB — HEMOCCULT STL QL: POSITIVE

## 2025-05-23 PROCEDURE — 82274 ASSAY TEST FOR BLOOD FECAL: CPT

## 2025-06-02 ENCOUNTER — TELEPHONE (OUTPATIENT)
Facility: CLINIC | Age: 65
End: 2025-06-02

## 2025-06-02 DIAGNOSIS — E11.65 UNCONTROLLED TYPE 2 DIABETES MELLITUS WITH HYPERGLYCEMIA (HCC): Primary | ICD-10-CM

## 2025-06-02 RX ORDER — SEMAGLUTIDE 0.68 MG/ML
0.5 INJECTION, SOLUTION SUBCUTANEOUS WEEKLY
Qty: 9 ML | Refills: 0 | Status: SHIPPED | OUTPATIENT
Start: 2025-06-02

## 2025-06-02 NOTE — TELEPHONE ENCOUNTER
Endocrine Refill protocol for oral and injectable diabetic medications    Protocol Criteria:  PASSED  Reason: N/A    If all below requirements are met, send a 90-day supply with 1 refill per provider protocol.    Verify appointment with Endocrinology completed in the last 6 months or scheduled in the next 3 months.  Verify A1C has been completed within the last 6 months and is below 8.5%     Last completed office visit: 1/23/2025 Alex Hankins APRN   Next scheduled Follow up:   Future Appointments   Date Time Provider Department Center   8/12/2025  5:30 PM Alex Hankins APRN [facility]   8/19/2025  2:00 PM Brodie Ware MD [facility]   11/1/2025  9:00 AM James Do MD [facility]      Last A1c result: Last A1c value was 8.3% done 5/3/2025.

## 2025-06-05 RX ORDER — BLOOD SUGAR DIAGNOSTIC
STRIP MISCELLANEOUS
Qty: 100 STRIP | Refills: 1 | Status: SHIPPED | OUTPATIENT
Start: 2025-06-05

## 2025-06-05 NOTE — TELEPHONE ENCOUNTER
Script resent for the One touch ultra blue test strips. Approved per protocol.       Endocrine Refill protocol for Glucose testing supplies     Protocol Criteria: PASSED Reason: N/A    If below requirement is met, send a 90-day supply with 1 refill per provider protocol.    Verify appointment with Endocrinology completed in the last 6 months or scheduled in the next 3 months.    Last completed office visit:1/23/2025 Alex Hankins APRN   Last completed telemed visit: Visit date not found  Next scheduled Follow up:   Future Appointments   Date Time Provider Department Center   8/12/2025  5:30 PM Alex Hankins APRN [facility]   8/19/2025  2:00 PM Brodie Ware MD [facility]   11/1/2025  9:00 AM James Do MD [facility]        90 day +1 refill approved per protocol

## 2025-08-11 RX ORDER — SEMAGLUTIDE 0.68 MG/ML
0.5 INJECTION, SOLUTION SUBCUTANEOUS WEEKLY
Qty: 9 ML | Refills: 0 | Status: SHIPPED | OUTPATIENT
Start: 2025-08-11

## 2025-08-11 RX ORDER — ATORVASTATIN CALCIUM 40 MG/1
40 TABLET, FILM COATED ORAL NIGHTLY
Qty: 90 TABLET | Refills: 0 | Status: SHIPPED | OUTPATIENT
Start: 2025-08-11

## 2025-08-12 ENCOUNTER — OFFICE VISIT (OUTPATIENT)
Dept: ENDOCRINOLOGY CLINIC | Facility: CLINIC | Age: 65
End: 2025-08-12

## 2025-08-12 VITALS
RESPIRATION RATE: 18 BRPM | HEART RATE: 79 BPM | BODY MASS INDEX: 31.15 KG/M2 | HEIGHT: 72 IN | WEIGHT: 230 LBS | SYSTOLIC BLOOD PRESSURE: 129 MMHG | DIASTOLIC BLOOD PRESSURE: 77 MMHG

## 2025-08-12 DIAGNOSIS — E11.65 UNCONTROLLED TYPE 2 DIABETES MELLITUS WITH HYPERGLYCEMIA (HCC): Primary | ICD-10-CM

## 2025-08-12 LAB
GLUCOSE BLOOD: 114
HEMOGLOBIN A1C: 7.9 % (ref 4.3–5.6)
TEST STRIP LOT #: NORMAL NUMERIC

## 2025-08-12 PROCEDURE — 3008F BODY MASS INDEX DOCD: CPT | Performed by: NURSE PRACTITIONER

## 2025-08-12 PROCEDURE — 82947 ASSAY GLUCOSE BLOOD QUANT: CPT | Performed by: NURSE PRACTITIONER

## 2025-08-12 PROCEDURE — 3074F SYST BP LT 130 MM HG: CPT | Performed by: NURSE PRACTITIONER

## 2025-08-12 PROCEDURE — 3060F POS MICROALBUMINURIA REV: CPT | Performed by: NURSE PRACTITIONER

## 2025-08-12 PROCEDURE — 99214 OFFICE O/P EST MOD 30 MIN: CPT | Performed by: NURSE PRACTITIONER

## 2025-08-12 PROCEDURE — 3078F DIAST BP <80 MM HG: CPT | Performed by: NURSE PRACTITIONER

## 2025-08-12 PROCEDURE — 3061F NEG MICROALBUMINURIA REV: CPT | Performed by: NURSE PRACTITIONER

## 2025-08-12 PROCEDURE — 3052F HG A1C>EQUAL 8.0%<EQUAL 9.0%: CPT | Performed by: NURSE PRACTITIONER

## 2025-08-12 PROCEDURE — 83036 HEMOGLOBIN GLYCOSYLATED A1C: CPT | Performed by: NURSE PRACTITIONER

## 2025-08-12 PROCEDURE — 3051F HG A1C>EQUAL 7.0%<8.0%: CPT | Performed by: NURSE PRACTITIONER

## 2025-08-12 RX ORDER — SEMAGLUTIDE 1.34 MG/ML
1 INJECTION, SOLUTION SUBCUTANEOUS WEEKLY
Qty: 9 ML | Refills: 1 | Status: SHIPPED | OUTPATIENT
Start: 2025-08-12

## (undated) NOTE — LETTER
January 7, 2021    Alma Sanchez MD  [address]     Patient: Aryan Hayes   YOB: 1960   Date of Visit: 1/7/2021       Dear Dr. Loree Jones MD:    Thank you for referring Aryan Hayes to me for evaluation.  Here i • Multiple Vitamins-Minerals (CENTRUM SILVER) Oral Tab Take 1 tablet by mouth daily. • Omega-3-acid Ethyl Esters 1 g Oral Cap Take 1 g by mouth daily. • lisinopril 40 MG Oral Tab Take 1 tablet (40 mg total) by mouth daily.  90 tablet 0   • atorvasta Vessels Normal Normal    Periphery Normal Normal            Refraction     Wearing Rx       Sphere Cylinder    Right +2.00 Sphere    Left +2.00 Sphere    Type: OTC reading only          Manifest Refraction (Auto)       Sphere Cylinder [place] [facility] [place]

## (undated) NOTE — Clinical Note
Thank you for the kind referral. Please feel free to reach out with any questions.   Anthony Hernandez MD